# Patient Record
Sex: FEMALE | Race: WHITE | NOT HISPANIC OR LATINO | Employment: PART TIME | URBAN - METROPOLITAN AREA
[De-identification: names, ages, dates, MRNs, and addresses within clinical notes are randomized per-mention and may not be internally consistent; named-entity substitution may affect disease eponyms.]

---

## 2018-07-13 ENCOUNTER — APPOINTMENT (OUTPATIENT)
Dept: WOMENS IMAGING | Facility: HOSPITAL | Age: 48
End: 2018-07-13

## 2018-07-13 PROCEDURE — 77067 SCR MAMMO BI INCL CAD: CPT | Performed by: RADIOLOGY

## 2018-07-24 ENCOUNTER — APPOINTMENT (OUTPATIENT)
Dept: WOMENS IMAGING | Facility: HOSPITAL | Age: 48
End: 2018-07-24

## 2018-07-24 PROCEDURE — MDREVSPNEW: Performed by: RADIOLOGY

## 2018-07-24 PROCEDURE — 76641 ULTRASOUND BREAST COMPLETE: CPT | Performed by: RADIOLOGY

## 2019-09-06 ENCOUNTER — APPOINTMENT (OUTPATIENT)
Dept: WOMENS IMAGING | Facility: HOSPITAL | Age: 49
End: 2019-09-06

## 2019-09-16 ENCOUNTER — APPOINTMENT (OUTPATIENT)
Dept: WOMENS IMAGING | Facility: HOSPITAL | Age: 49
End: 2019-09-16

## 2019-09-16 PROCEDURE — 77067 SCR MAMMO BI INCL CAD: CPT | Performed by: RADIOLOGY

## 2019-09-16 PROCEDURE — 77063 BREAST TOMOSYNTHESIS BI: CPT | Performed by: RADIOLOGY

## 2021-02-24 ENCOUNTER — TELEPHONE (OUTPATIENT)
Dept: INTERNAL MEDICINE | Facility: CLINIC | Age: 51
End: 2021-02-24

## 2021-02-24 NOTE — TELEPHONE ENCOUNTER
PLEASE CONTACT HUB MANAGER AND INFORM ABOUT DANILO PRESCOTT LEAVING TO UPDATE [employer] DIRECTORY.

## 2021-03-05 ENCOUNTER — APPOINTMENT (OUTPATIENT)
Dept: WOMENS IMAGING | Facility: HOSPITAL | Age: 51
End: 2021-03-05

## 2021-03-05 PROCEDURE — 77063 BREAST TOMOSYNTHESIS BI: CPT | Performed by: RADIOLOGY

## 2021-03-05 PROCEDURE — 77067 SCR MAMMO BI INCL CAD: CPT | Performed by: RADIOLOGY

## 2021-06-29 ENCOUNTER — TRANSCRIBE ORDERS (OUTPATIENT)
Dept: ADMINISTRATIVE | Facility: HOSPITAL | Age: 51
End: 2021-06-29

## 2021-06-29 DIAGNOSIS — R56.9 SEIZURES (HCC): Primary | ICD-10-CM

## 2021-07-30 ENCOUNTER — HOSPITAL ENCOUNTER (OUTPATIENT)
Dept: MRI IMAGING | Facility: HOSPITAL | Age: 51
Discharge: HOME | End: 2021-07-30
Admitting: FAMILY MEDICINE

## 2021-07-30 DIAGNOSIS — R56.9 SEIZURES (HCC): ICD-10-CM

## 2021-07-30 PROCEDURE — 70551 MRI BRAIN STEM W/O DYE: CPT

## 2021-08-13 ENCOUNTER — OFFICE VISIT (OUTPATIENT)
Dept: NEUROLOGY | Facility: CLINIC | Age: 51
End: 2021-08-13

## 2021-08-13 VITALS
WEIGHT: 168 LBS | BODY MASS INDEX: 28.68 KG/M2 | HEIGHT: 64 IN | DIASTOLIC BLOOD PRESSURE: 68 MMHG | HEART RATE: 106 BPM | OXYGEN SATURATION: 98 % | SYSTOLIC BLOOD PRESSURE: 138 MMHG

## 2021-08-13 DIAGNOSIS — R40.4 SPELL OF ALTERED CONSCIOUSNESS: Primary | ICD-10-CM

## 2021-08-13 PROCEDURE — 99205 OFFICE O/P NEW HI 60 MIN: CPT | Performed by: PSYCHIATRY & NEUROLOGY

## 2021-08-13 NOTE — PROGRESS NOTES
"Chief Complaint  Seizures (began June 2015 and worsening,)    Subjective          Joseline Smith presents to [facility] NEUROLOGY for   HISTORY OF PRESENT ILLNESS:    Joseline Smith is a 50 year old right handed woman who presents to neurology clinic for initial evaluation and treatment of seizures.  She reports a history of spells starting in 6/2015 which were infrequent initially.  She was getting ready to work out and suddenly she had a strange feeling.  She has a strange epigastric sensation and the left side of her body will start to tingle and feel strange and then there is often a dejavu and dream like sensation.  She has a feeling of impending doom.  The spells last less than a minute about 45 seconds.  She initially thought she was having a panic attack at that time.  In the beginning she was having them maybe once every couple months.  She thinks that they are getting worse as she is perimenopausal.  She has noticed increased episodes with insomnia.  She tells me she has had some auditory involvement more recently where people will be talking to her and saying things that they are not saying.  This has happened 3 times more recently since end of July 2021.  She denies any associated headaches.  She did have migraines around 10 years ago with visual auras.  She seems to get them every couple weeks and she has gone up to 21 to 28 days without having them at times.  They tend to cluster around her menses.  She had a brain MRI scan done on 7/30/2021 which I reviewed the images independently on her visit today and demonstrated a possible old very small lacunar stroke in the left caudate which I informed her of the findings on her visit today.  These spells have only been increasing in frequency.  She denies any head trauma.  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  She denies any family history of seizures.  No history of childhood febrile seizures.  No history of meningitis or encephalitis.   She denies any anxiety.      Past Medical History:   Diagnosis Date   • Migraine    • Numbness and tingling    • Seizures (CMS/HCC)    • Stroke (CMS/HCC)         Family History   Problem Relation Age of Onset   • Arthritis Mother    • Diabetes Mother    • Migraines Sister         Social History     Socioeconomic History   • Marital status:      Spouse name: Not on file   • Number of children: Not on file   • Years of education: Not on file   • Highest education level: Not on file   Tobacco Use   • Smoking status: Former Smoker   Vaping Use   • Vaping Use: Never used   Substance and Sexual Activity   • Alcohol use: Yes     Comment: occasional   • Drug use: Never   • Sexual activity: Defer        I have personally reviewed the ROS as stated below.     Review of Systems   Constitutional: Negative for activity change, appetite change and fever.   HENT: Negative for hearing loss, trouble swallowing and voice change.    Eyes: Negative for blurred vision, double vision and pain.   Respiratory: Negative for cough, shortness of breath and wheezing.    Cardiovascular: Negative for palpitations and leg swelling.   Gastrointestinal: Negative for constipation, nausea and GERD.   Endocrine: Negative for heat intolerance and polydipsia.   Genitourinary: Negative for dysuria, flank pain and frequency.   Musculoskeletal: Negative for back pain and gait problem.   Allergic/Immunologic: Negative for environmental allergies and food allergies.   Neurological: Positive for seizures and numbness. Negative for dizziness, tremors, syncope, facial asymmetry, speech difficulty, weakness, light-headedness, headache, memory problem and confusion.   Hematological: Does not bruise/bleed easily.   Psychiatric/Behavioral: Negative  "for agitation, behavioral problems, decreased concentration, dysphoric mood, hallucinations, self-injury, sleep disturbance, suicidal ideas, negative for hyperactivity, depressed mood and stress. The patient is not nervous/anxious.         Objective   Vital Signs:   /68   Pulse 106   Ht 162.6 cm (64\")   Wt 76.2 kg (168 lb)   SpO2 98%   BMI 28.84 kg/m²       PHYSICAL EXAM:    General   Mental Status - Alert. General Appearance - Well developed, Well groomed, Oriented and Cooperative. Orientation - Oriented X3.       Head and Neck  Head - normocephalic, atraumatic with no lesions or palpable masses.  Neck    Global Assessment - supple.       Eye   Sclera/Conjunctiva - Bilateral - Normal.    ENMT  Mouth and Throat   Oral Cavity/Oropharynx: Oropharynx - the soft palate,uvula and tongue are normal in appearance.    Chest and Lung Exam   Chest - lung clear to auscultation bilaterally.    Cardiovascular   Cardiovascular examination reveals  - normal heart sounds, regular rate and rhythm.    Neurologic   Mental Status: Speech - Normal. Cognitive function - appropriate fund of knowledge. No impairment of attention, Impairment of concentration, impairment of long term memory or impairment of short term memory.  Cranial Nerves:   II Optic: Visual acuity - Left - Normal. Right - Normal. Visual fields - Normal (to confrontation).  III Oculomotor: Pupillary constriction - Left - Normal. Right - Normal.  VII Facial: - Normal Bilaterally.  VIII Acoustic - Bilateral - Hearing normal and (Hearing tested by finger rub).   IX Glossopharyngeal / X Vagus - Normal.  XI Accessory: Trapezius - Bilateral - Normal. Sternocleidomastoid - Bilateral - Normal.  XII Hypoglossal - Bilateral - Normal.  Eye Movements: - Normal Bilaterally.  Sensory:   Light Touch: Intact - Globally.  Motor:   Bulk and Contour: - Normal.  Tone: - Normal.  Tremor: Not present.  Strength: 5/5 normal muscle strength - All Muscles.   General Assessment of " Reflexes: - deep tendon reflexes are normal. Coordination - No Impairment of finger-to-nose or Impairment of rapid alternating movements. Gait - Normal.       Result Review :                 Assessment and Plan    Problem List Items Addressed This Visit        Neuro    Spell of altered consciousness - Primary    Current Assessment & Plan     50 year old woman with spells/episodes that are short lasting with dejavu sensation, epigastric sensation and feeling of impending doom and tingling on the left side of her body without LOC.  She had a brain MRI scan done on 7/30/2021 which I reviewed the images independently on her visit today and demonstrated a possible old very small lacunar stroke in the left caudate which I informed her of the findings on her visit today. I will order a prolonged EEG to further evaluate her underlying risk for possible temporal lobe epilepsy or focal epilepsy given her sensations which appear to be classic temporal lobe epilepsy auras.  We will start patient on AED but she would like to wait to have the EEG done before starting AED.  I advised her not to drive till we get the EEG done and start her on therapeutic dose of medication.  Discussed seizure precautions at length and provided patient education information.           Relevant Orders    EEG          I spent 64 minutes caring for Joseline on this date of service. This time includes time spent by me in the following activities:preparing for the visit, reviewing tests, obtaining and/or reviewing a separately obtained history, performing a medically appropriate examination and/or evaluation , counseling and educating the patient/family/caregiver, ordering medications, tests, or procedures, documenting information in the medical record, independently interpreting results and communicating that information with the patient/family/caregiver and care coordination    Follow Up   No follow-ups on file.  Patient was given instructions and  counseling regarding her condition or for health maintenance advice. Please see specific information pulled into the AVS if appropriate.

## 2021-08-13 NOTE — ASSESSMENT & PLAN NOTE
50 year old woman with spells/episodes that are short lasting with dejavu sensation, epigastric sensation and feeling of impending doom and tingling on the left side of her body without LOC.  She had a brain MRI scan done on 7/30/2021 which I reviewed the images independently on her visit today and demonstrated a possible old very small lacunar stroke in the left caudate which I informed her of the findings on her visit today. I will order a prolonged EEG to further evaluate her underlying risk for possible temporal lobe epilepsy or focal epilepsy given her sensations which appear to be classic temporal lobe epilepsy auras.  We will start patient on AED but she would like to wait to have the EEG done before starting AED.  I advised her not to drive till we get the EEG done and start her on therapeutic dose of medication.  Discussed seizure precautions at length and provided patient education information.

## 2021-08-13 NOTE — PATIENT INSTRUCTIONS
[facility]  Marisa Maddox MD  Neurology clinic  [phone]    With anti-seizure medications, you may initially notice side effects of fatigue, drowsiness, unsteadiness, and dizziness.  Other possible side effects include nausea, abdominal pain, headache, blurry or double vision, slurred speech and mood changes.  Generally, patients will noticed these symptoms when the medication is first started or with higher doses and will go away with time.    It is import to consistently take your medication every day.  Missing just one dose may put you at risk for a breakthrough seizure.  Consider using reminders on your phone or a pill box.    If you develop a rash, please call the neurology clinic immediately or notify another healthcare professional, as this may be potentially life-threatening.  If you are unable to reach a healthcare professional, go to the emergency room immediately for further evaluation.    If you develop thoughts of wanting to hurt yourself or others, please call the neurology clinic immediately to notify another healthcare professional.  If you are unable to reach a healthcare professional, go to the emergency room immediately for further evaluation.    It is the [place] state law that you cannot drive within 90 days of a seizure.    You should avoid certain activities that if you were to have a seizure, you could harm yourself or others. In general, it is recommended that you avoid operating heavy machinery or power tools, swimming or taking baths by yourself (showers are ok), don't stand over open flames, don't get on high ladders or the roof.  I also recommend to avoid sleeping on your stomach.    For further information on epilepsy and resources available to patients and their families, please visit the [facility] at www.efky.org or call [phone].    **Check out the [facility]'s monthly Art Group Gathering.  They are located  at [facility], [address] [place] [address].  Call Laura Puga at [phone] or email her at [email or web address] for the dates of future gatherings.**      **If you have having memory problems, consider HOBSCOTCH (Home-Based Self-management and Cognitive Training Changes lives).  It is an 8 week self-management program for adults with epilepsy and memory problems.  The program is free at the [facility].  Contact Beatriz Gusman at [phone] or [email or web address].**          In general, we recommend using good judgement when you are doing certain activities and to avoid those activities that if you were to have a seizure, you could harm yourself or others. In the [facility], it is the law that you cannot drive within 90 days of a seizure. We also recommend not standing over open flames, not getting on high ladders or the roof, not swimming or taking baths by yourself (showers are ok) and not operating heavy machinery or power tools.

## 2021-08-24 ENCOUNTER — TELEPHONE (OUTPATIENT)
Dept: NEUROLOGY | Facility: CLINIC | Age: 51
End: 2021-08-24

## 2021-08-24 NOTE — TELEPHONE ENCOUNTER
Caller: Joseline Smith    Relationship: Self    Best call back number: [phone]    What was the call regarding: PT CALLED TO VERIFY IF EEG SCHEDULED FOR TOMORROW MORNING AT 10:30AM IS A SLEEP DEPRIVED EEG OR NOT.    PER KINDRA, EEG IS SLEEP DEPRIVED. RECOMMENDED PT TAKE A SMALL NAP THIS AFTERNOON/EVENING AND SLEEP NO LATER THAN 10/11PM TONIGHT TO ENSURE SHE IS ABLE TO SLEEP FOR EEG APPT IN THE MORNING. PT VERBALIZED UNDERSTANDING AND THANKED OFFICE FOR THE CLARIFICATION.    DOCUMENTING PER HUB PROTOCOL.

## 2021-08-25 ENCOUNTER — TELEPHONE (OUTPATIENT)
Dept: NEUROLOGY | Facility: CLINIC | Age: 51
End: 2021-08-25

## 2021-08-25 ENCOUNTER — HOSPITAL ENCOUNTER (OUTPATIENT)
Dept: NEUROLOGY | Facility: HOSPITAL | Age: 51
Discharge: HOME | End: 2021-08-25
Admitting: PSYCHIATRY & NEUROLOGY

## 2021-08-25 DIAGNOSIS — R40.4 SPELL OF ALTERED CONSCIOUSNESS: ICD-10-CM

## 2021-08-25 DIAGNOSIS — R40.4 SPELL OF ALTERED CONSCIOUSNESS: Primary | ICD-10-CM

## 2021-08-25 PROCEDURE — 95813 EEG EXTND MNTR 61-119 MIN: CPT | Performed by: PSYCHIATRY & NEUROLOGY

## 2021-08-25 PROCEDURE — 95813 EEG EXTND MNTR 61-119 MIN: CPT

## 2021-08-25 RX ORDER — LEVETIRACETAM 500 MG/1
500 TABLET ORAL 2 TIMES DAILY
Qty: 67 TABLET | Refills: 0 | Status: SHIPPED | OUTPATIENT
Start: 2021-08-25 | End: 2021-10-26 | Stop reason: SDUPTHER

## 2021-08-25 NOTE — TELEPHONE ENCOUNTER
Per Dr. Maddox he wanted me to notify patient that her EEG results are what he suspected, and that he would like for patient to start KEPPRA 500mg at night for one week, then Keppra 500mg twice a day. Sent into patients pharmacy. [facility] for patient asking to call the office to set up a sooner appt to discuss further

## 2021-10-26 DIAGNOSIS — R40.4 SPELL OF ALTERED CONSCIOUSNESS: ICD-10-CM

## 2021-10-26 RX ORDER — LEVETIRACETAM 500 MG/1
500 TABLET ORAL 2 TIMES DAILY
Qty: 67 TABLET | Refills: 0 | Status: SHIPPED | OUTPATIENT
Start: 2021-10-26 | End: 2021-11-17 | Stop reason: SDUPTHER

## 2021-10-26 NOTE — TELEPHONE ENCOUNTER
Caller: Joseline Smith    Relationship: Self      Medication requested (name and dosage): KEPPRA 500MG TABLETS    Requested Prescriptions:   Requested Prescriptions     Pending Prescriptions Disp Refills   • levETIRAcetam (KEPPRA) 500 MG tablet 67 tablet 0     Sig: Take 1 tablet by mouth 2 (Two) Times a Day. Take 1 tablet at night for one week, then twice a day        Pharmacy where request should be sent: [address] AT [facility] [phone] [facility] [phone] [facility]    Additional details provided by patient: PT STATES SHE HAS ABOUT 1 WEEKS WORTH LEFT    Best call back number: [phone]    Does the patient have less than a 3 day supply:  [x] Yes  [x] No

## 2021-11-17 ENCOUNTER — OFFICE VISIT (OUTPATIENT)
Dept: NEUROLOGY | Facility: CLINIC | Age: 51
End: 2021-11-17

## 2021-11-17 VITALS
BODY MASS INDEX: 27.83 KG/M2 | OXYGEN SATURATION: 100 % | HEIGHT: 64 IN | HEART RATE: 63 BPM | SYSTOLIC BLOOD PRESSURE: 112 MMHG | WEIGHT: 163 LBS | DIASTOLIC BLOOD PRESSURE: 72 MMHG

## 2021-11-17 DIAGNOSIS — R40.4 SPELL OF ALTERED CONSCIOUSNESS: ICD-10-CM

## 2021-11-17 DIAGNOSIS — G40.109 SEIZURE, TEMPORAL LOBE (HCC): Primary | ICD-10-CM

## 2021-11-17 PROCEDURE — 99214 OFFICE O/P EST MOD 30 MIN: CPT | Performed by: PSYCHIATRY & NEUROLOGY

## 2021-11-17 RX ORDER — LEVETIRACETAM 500 MG/1
500 TABLET ORAL 2 TIMES DAILY
Qty: 60 TABLET | Refills: 2 | Status: SHIPPED | OUTPATIENT
Start: 2021-11-17 | End: 2022-02-21 | Stop reason: SDUPTHER

## 2021-11-17 NOTE — PROGRESS NOTES
"Chief Complaint  Seizures (LV: 8/13 EEG f/u)    Subjective          Joseline Smith presents to [facility] NEUROLOGY for   HISTORY OF PRESENT ILLNESS:    Joseline Smith is a 51 year old right handed woman who returns to neurology clinic for follow up evaluation and treatment of seizures and concerns for left temporal lobe epilepsy.  She reports a history of spells starting in 6/2015 which were infrequent initially.  She was getting ready to work out and suddenly she had a strange feeling.  She has a strange epigastric sensation and the left side of her body will start to tingle and feel strange and then there is often a dejavu and dream like sensation.  She has a feeling of impending doom.  The spells last less than a minute about 45 seconds.  She initially thought she was having a panic attack at that time.  In the beginning she was having them maybe once every couple months.  She thinks that they are getting worse as she is perimenopausal.  She has noticed increased episodes with insomnia.  She tells me she has had some auditory involvement where people will be talking to her and saying things that they are not saying.  This has happened 3 times since end of July 2021 but has not had this since starting the Keppra.  She denies any associated headaches.  She did have migraines around 10 years ago with visual auras.  She seems to get them every couple weeks and she has gone up to 21 to 28 days without having them at times.  They tend to cluster around her menses.  She had a brain MRI scan done on 7/30/2021 which demonstrated a possible old very small lacunar stroke in the left caudate which I informed her of the findings on her visit today and she will start a baby aspirin.  These spells have only been increasing in frequency.  She denies any head trauma.  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  She denies any family history of seizures.  No history of childhood febrile seizures.  No history of meningitis or encephalitis.   She denies any anxiety.  She had a prolonged EEG which demonstrated focal right temporal slowing which may indicate an underlying structural or functional abnormality involving the right temporal lobe and occasional sharply contoured activity within this region which may be potentially epileptogenic activity placing patient at risk for focal seizures. I started her on Keppra 500 mg BID following these results.  She has not had any further seizures.  She thinks she feels better since starting this medicine and has not had a spell since 9/16/2021.    Past Medical History:   Diagnosis Date   • Migraine    • Numbness and tingling    • Seizures (HCC)    • Stroke (HCC)         Family History   Problem Relation Age of Onset   • Arthritis Mother    • Diabetes Mother    • Migraines Sister         Social History     Socioeconomic History   • Marital status:    Tobacco Use   • Smoking status: Former Smoker   Vaping Use   • Vaping Use: Never used   Substance and Sexual Activity   • Alcohol use: Yes     Comment: occasional   • Drug use: Never   • Sexual activity: Defer        I have personally reviewed the ROS as stated below.     Review of Systems   Neurological: Positive for dizziness (has subsided , but when just starting keppra) and seizures (last sz 9/16- she had COVID, but had delayed the start of Keppra). Negative for tremors, syncope, facial asymmetry, speech difficulty, weakness, light-headedness, numbness, headache, memory problem and confusion.   Psychiatric/Behavioral: Negative for agitation, behavioral problems, decreased concentration, dysphoric mood, hallucinations, self-injury, sleep disturbance, suicidal ideas, negative for hyperactivity, depressed mood and stress. " "The patient is not nervous/anxious.         Objective   Vital Signs:   /72 (BP Location: Right arm, Patient Position: Sitting)   Pulse 63   Ht 162.6 cm (64.02\")   Wt 73.9 kg (163 lb)   SpO2 100%   BMI 27.96 kg/m²       PHYSICAL EXAM:    General   Mental Status - Alert. General Appearance - Well developed, Well groomed, Oriented and Cooperative. Orientation - Oriented X3.       Head and Neck  Head - normocephalic, atraumatic with no lesions or palpable masses.  Neck    Global Assessment - supple.       Eye   Sclera/Conjunctiva - Bilateral - Normal.    ENMT  Mouth and Throat   Oral Cavity/Oropharynx: Oropharynx - the soft palate,uvula and tongue are normal in appearance.    Chest and Lung Exam   Chest - lung clear to auscultation bilaterally.    Cardiovascular   Cardiovascular examination reveals  - normal heart sounds, regular rate and rhythm.    Neurologic   Mental Status: Speech - Normal. Cognitive function - appropriate fund of knowledge. No impairment of attention, Impairment of concentration, impairment of long term memory or impairment of short term memory.  Cranial Nerves:   II Optic: Visual acuity - Left - Normal. Right - Normal. Visual fields - Normal (to confrontation).  III Oculomotor: Pupillary constriction - Left - Normal. Right - Normal.  VII Facial: - Normal Bilaterally.  VIII Acoustic - Bilateral - Hearing normal and (Hearing tested by finger rub).   IX Glossopharyngeal / X Vagus - Normal.  XI Accessory: Trapezius - Bilateral - Normal. Sternocleidomastoid - Bilateral - Normal.  XII Hypoglossal - Bilateral - Normal.  Eye Movements: - Normal Bilaterally.  Sensory:   Light Touch: Intact - Globally.  Motor:   Bulk and Contour: - Normal.  Tone: - Normal.  Tremor: Not present.  Strength: 5/5 normal muscle strength - All Muscles.   General Assessment of Reflexes: - deep tendon reflexes are normal. Coordination - No Impairment of finger-to-nose or Impairment of rapid alternating movements. Gait - " Normal.       Result Review :                 Assessment and Plan    Problem List Items Addressed This Visit        Neuro    Spell of altered consciousness    Current Assessment & Plan     As per temporal lobe epilepsy.           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  She had a brain MRI scan done on 7/30/2021 which demonstrated a possible old very small lacunar stroke in the left caudate which I informed her of the findings on her visit today and she should take a baby aspirin daily.   She had a prolonged EEG which demonstrated focal right temporal slowing which may indicate an underlying structural or functional abnormality involving the right temporal lobe and occasional sharply contoured activity within this region which may be potentially epileptogenic activity placing patient at risk for focal seizures.  I started her on Keppra 500 mg BID following these results.  She has not had any further seizures.  She thinks she feels better since starting this medicine and has not had a spell since 9/16/2021.  I will continue this medicine.  I discussed SUDEP risk with patient.  Discussed seizure precautions at length and provided patient education information.         Relevant Medications    levETIRAcetam (KEPPRA) 500 MG tablet          I spent 32 minutes caring for Joseline on this date of service. This time includes time spent by me in the following activities:preparing for the visit, reviewing tests, obtaining and/or reviewing a separately obtained history, performing a medically appropriate examination and/or evaluation , counseling and educating the  patient/family/caregiver, ordering medications, tests, or procedures, documenting information in the medical record and care coordination    Follow Up   Return in about 3 months (around 2/17/2022).  Patient was given instructions and counseling regarding her condition or for health maintenance advice. Please see specific information pulled into the AVS if appropriate.

## 2021-11-17 NOTE — PATIENT INSTRUCTIONS
[facility]  Marisa Maddox MD  Neurology clinic  [phone]    With anti-seizure medications, you may initially notice side effects of fatigue, drowsiness, unsteadiness, and dizziness.  Other possible side effects include nausea, abdominal pain, headache, blurry or double vision, slurred speech and mood changes.  Generally, patients will noticed these symptoms when the medication is first started or with higher doses and will go away with time.    It is import to consistently take your medication every day.  Missing just one dose may put you at risk for a breakthrough seizure.  Consider using reminders on your phone or a pill box.    If you develop a rash, please call the neurology clinic immediately or notify another healthcare professional, as this may be potentially life-threatening.  If you are unable to reach a healthcare professional, go to the emergency room immediately for further evaluation.    If you develop thoughts of wanting to hurt yourself or others, please call the neurology clinic immediately to notify another healthcare professional.  If you are unable to reach a healthcare professional, go to the emergency room immediately for further evaluation.    It is the [place] state law that you cannot drive within 90 days of a seizure.    You should avoid certain activities that if you were to have a seizure, you could harm yourself or others. In general, it is recommended that you avoid operating heavy machinery or power tools, swimming or taking baths by yourself (showers are ok), don't stand over open flames, don't get on high ladders or the roof.  I also recommend to avoid sleeping on your stomach.    For further information on epilepsy and resources available to patients and their families, please visit the Epilepsy Foundation of hospitals at www.efky.org or call [phone].    **Check out the Epilepsy Foundation of hospitals's monthly Art Group Gathering.  They are located  at [facility], [address] [place] [address].  Call Laura Puga at [phone] or email her at [email or web address] for the dates of future gatherings.**      **If you have having memory problems, consider HOBSCOTCH (Home-Based Self-management and Cognitive Training Changes lives).  It is an 8 week self-management program for adults with epilepsy and memory problems.  The program is free at the [facility].  Contact Beatriz Gusman at [phone] or [email or web address].**          In general, we recommend using good judgement when you are doing certain activities and to avoid those activities that if you were to have a seizure, you could harm yourself or others. In the [facility], it is the law that you cannot drive within 90 days of a seizure. We also recommend not standing over open flames, not getting on high ladders or the roof, not swimming or taking baths by yourself (showers are ok) and not operating heavy machinery or power tools.

## 2021-11-17 NOTE — ASSESSMENT & PLAN NOTE
50 year old woman with spells/episodes that are short lasting with dejavu sensation, epigastric sensation and feeling of impending doom and tingling on the left side of her body without LOC and EEG findings consistent with right temporal lobe epilepsy.  She had a brain MRI scan done on 7/30/2021 which demonstrated a possible old very small lacunar stroke in the left caudate which I informed her of the findings on her visit today and she should take a baby aspirin daily.   She had a prolonged EEG which demonstrated focal right temporal slowing which may indicate an underlying structural or functional abnormality involving the right temporal lobe and occasional sharply contoured activity within this region which may be potentially epileptogenic activity placing patient at risk for focal seizures.  I started her on Keppra 500 mg BID following these results.  She has not had any further seizures.  She thinks she feels better since starting this medicine and has not had a spell since 9/16/2021.  I will continue this medicine.  I discussed SUDEP risk with patient.  Discussed seizure precautions at length and provided patient education information.

## 2022-02-21 DIAGNOSIS — G40.109 SEIZURE, TEMPORAL LOBE: ICD-10-CM

## 2022-02-21 DIAGNOSIS — R40.4 SPELL OF ALTERED CONSCIOUSNESS: ICD-10-CM

## 2022-02-21 RX ORDER — LEVETIRACETAM 500 MG/1
500 TABLET ORAL 2 TIMES DAILY
Qty: 60 TABLET | Refills: 2 | Status: SHIPPED | OUTPATIENT
Start: 2022-02-21 | End: 2022-03-25 | Stop reason: SDUPTHER

## 2022-03-25 ENCOUNTER — OFFICE VISIT (OUTPATIENT)
Dept: NEUROLOGY | Facility: CLINIC | Age: 52
End: 2022-03-25

## 2022-03-25 VITALS
BODY MASS INDEX: 27.83 KG/M2 | HEART RATE: 67 BPM | OXYGEN SATURATION: 98 % | SYSTOLIC BLOOD PRESSURE: 134 MMHG | HEIGHT: 64 IN | DIASTOLIC BLOOD PRESSURE: 80 MMHG | WEIGHT: 163 LBS

## 2022-03-25 DIAGNOSIS — R40.4 SPELL OF ALTERED CONSCIOUSNESS: ICD-10-CM

## 2022-03-25 DIAGNOSIS — G40.109 SEIZURE, TEMPORAL LOBE: Primary | ICD-10-CM

## 2022-03-25 PROCEDURE — 99213 OFFICE O/P EST LOW 20 MIN: CPT | Performed by: PSYCHIATRY & NEUROLOGY

## 2022-03-25 RX ORDER — LEVETIRACETAM 500 MG/1
500 TABLET ORAL 2 TIMES DAILY
Qty: 60 TABLET | Refills: 2 | Status: SHIPPED | OUTPATIENT
Start: 2022-03-25 | End: 2022-07-21 | Stop reason: SDUPTHER

## 2022-03-25 NOTE — PATIENT INSTRUCTIONS
[facility]  Marisa Maddox MD  Neurology clinic  [phone]    With anti-seizure medications, you may initially notice side effects of fatigue, drowsiness, unsteadiness, and dizziness.  Other possible side effects include nausea, abdominal pain, headache, blurry or double vision, slurred speech and mood changes.  Generally, patients will noticed these symptoms when the medication is first started or with higher doses and will go away with time.    It is import to consistently take your medication every day.  Missing just one dose may put you at risk for a breakthrough seizure.  Consider using reminders on your phone or a pill box.    If you develop a rash, please call the neurology clinic immediately or notify another healthcare professional, as this may be potentially life-threatening.  If you are unable to reach a healthcare professional, go to the emergency room immediately for further evaluation.    If you develop thoughts of wanting to hurt yourself or others, please call the neurology clinic immediately to notify another healthcare professional.  If you are unable to reach a healthcare professional, go to the emergency room immediately for further evaluation.    It is the [place] state law that you cannot drive within 90 days of a seizure.    You should avoid certain activities that if you were to have a seizure, you could harm yourself or others. In general, it is recommended that you avoid operating heavy machinery or power tools, swimming or taking baths by yourself (showers are ok), don't stand over open flames, don't get on high ladders or the roof.  I also recommend to avoid sleeping on your stomach.    For further information on epilepsy and resources available to patients and their families, please visit the Epilepsy Foundation of [facility] at www.efky.org or call [phone].    **Check out the Epilepsy [facility] of [facility]'s monthly Art Group Gathering.  They are located  at [facility], [address] [place] [address].  Call Laura Puga at [phone] or email her at [email or web address] for the dates of future gatherings.**      **If you have having memory problems, consider HOBSCOTCH (Home-Based Self-management and Cognitive Training Changes lives).  It is an 8 week self-management program for adults with epilepsy and memory problems.  The program is free at the [facility].  Contact Beatriz Gusman at [phone] or [email or web address].**         In general, we recommend using good judgement when you are doing certain activities and to avoid those activities that if you were to have a seizure, you could harm yourself or others. In the [facility], it is the law that you cannot drive within 90 days of a seizure. We also recommend not standing over open flames, not getting on high ladders or the roof, not swimming or taking baths by yourself (showers are ok) and not operating heavy machinery or power tools.

## 2022-03-25 NOTE — ASSESSMENT & PLAN NOTE
51 year old woman with spells/episodes that are short lasting with dejavu sensation, epigastric sensation and feeling of impending doom and tingling on the left side of her body without LOC and EEG findings consistent with right temporal lobe epilepsy.  She had a brain MRI scan done on 7/30/2021 which demonstrated a possible old very small lacunar stroke in the left caudate which I informed her of the findings on her visit today and she should take a baby aspirin daily.   She had a prolonged EEG which demonstrated focal right temporal slowing which may indicate an underlying structural or functional abnormality involving the right temporal lobe and occasional sharply contoured activity within this region which may be potentially epileptogenic activity placing patient at risk for focal seizures.  I started her on Keppra 500 mg BID following these results.  She has not had any further seizures.  She is tolerating this medicine well and tells me briefly after taking her medicine she is slightly dizzy especially if she does not eat food when taking the medicine.  Discussed seizure precautions.  Will follow up in 3 months and sooner if needed.

## 2022-03-25 NOTE — PROGRESS NOTES
Chief Complaint  Seizures    Subjective          Joseline Smith presents to [facility] NEUROLOGY for   HISTORY OF PRESENT ILLNESS:    Joseline Smith is a 51 year old woman with spells/episodes that are short lasting with dejavu sensation, epigastric sensation and feeling of impending doom and tingling on the left side of her body without LOC and EEG findings consistent with right temporal lobe epilepsy.  She presents today for follow up.  She had a brain MRI scan done on 7/30/2021 which demonstrated a possible old very small lacunar stroke in the left caudate which I informed her of the findings on her visit today and she should take a baby aspirin daily.   She had a prolonged EEG which demonstrated focal right temporal slowing which may indicate an underlying structural or functional abnormality involving the right temporal lobe and occasional sharply contoured activity within this region which may be potentially epileptogenic activity placing patient at risk for focal seizures.  I started her on Keppra 500 mg BID following these results.  She has not had any further seizures.  She is tolerating this medicine well and tells me briefly after taking her medicine she is slightly dizzy especially if she does not eat food when taking the medicine.      Past Medical History:   Diagnosis Date   • Migraine    • Numbness and tingling    • Seizures (HCC)    • Stroke (HCC)         Family History   Problem Relation Age of Onset   • Arthritis Mother    • Diabetes Mother    • Migraines Sister         Social History     Socioeconomic History   • Marital status:    Tobacco Use   • Smoking status: Former Smoker   Vaping Use   • Vaping Use: Never used   Substance and Sexual Activity   • Alcohol use: Yes     Comment: occasional   • Drug use: Never   • Sexual activity: Defer        I have reviewed and confirmed the accuracy of the ROS as documented by the MA/DASHA/RN Marisa Maddox MD    Review of Systems  "  Neurological: Positive for dizziness. Negative for tremors, seizures, syncope, facial asymmetry, speech difficulty, weakness, light-headedness, numbness, headache, memory problem and confusion.   Psychiatric/Behavioral: Negative for agitation, behavioral problems, decreased concentration, dysphoric mood, hallucinations, self-injury, sleep disturbance, suicidal ideas, negative for hyperactivity, depressed mood and stress. The patient is not nervous/anxious.         Objective   Vital Signs:   /80 (BP Location: Left arm, Patient Position: Sitting)   Pulse 67   Ht 162.6 cm (64.02\")   Wt 73.9 kg (163 lb)   SpO2 98%   BMI 27.96 kg/m²       PHYSICAL EXAM:    General   Mental Status - Alert. General Appearance - Well developed, Well groomed, Oriented and Cooperative. Orientation - Oriented X3.       Head and Neck  Head - normocephalic, atraumatic with no lesions or palpable masses.  Neck    Global Assessment - supple.       Eye   Sclera/Conjunctiva - Bilateral - Normal.    ENMT  Mouth and Throat   Oral Cavity/Oropharynx: Oropharynx - the soft palate,uvula and tongue are normal in appearance.    Chest and Lung Exam   Chest - lung clear to auscultation bilaterally.    Cardiovascular   Cardiovascular examination reveals  - normal heart sounds, regular rate and rhythm.    Neurologic   Mental Status: Speech - Normal. Cognitive function - appropriate fund of knowledge. No impairment of attention, Impairment of concentration, impairment of long term memory or impairment of short term memory.  Cranial Nerves:   II Optic: Visual acuity - Left - Normal. Right - Normal. Visual fields - Normal (to confrontation).  III Oculomotor: Pupillary constriction - Left - Normal. Right - Normal.  VII Facial: - Normal Bilaterally.   IX Glossopharyngeal / X Vagus - Normal.  XI Accessory: Trapezius - Bilateral - Normal. Sternocleidomastoid - Bilateral - Normal.  XII Hypoglossal - Bilateral - Normal.  Eye Movements: - Normal " Bilaterally.  Sensory:   Light Touch: Intact - Globally.  Motor:   Bulk and Contour: - Normal.  Tone: - Normal.  Tremor: Not present.  Strength: 5/5 normal muscle strength - All Muscles.   General Assessment of Reflexes: - deep tendon reflexes are normal. Coordination - No Impairment of finger-to-nose or Impairment of rapid alternating movements. Gait - Normal.     Result Review :{ Labs  Result Review  Imaging  Med Tab  Media :23}                 Assessment and Plan    Problem List Items Addressed This Visit        Neuro    Spell of altered consciousness    Relevant Medications    levETIRAcetam (KEPPRA) 500 MG tablet    Seizure, temporal lobe (HCC) - Primary    Current Assessment & Plan     51 year old woman with spells/episodes that are short lasting with dejavu sensation, epigastric sensation and feeling of impending doom and tingling on the left side of her body without LOC and EEG findings consistent with right temporal lobe epilepsy.  She had a brain MRI scan done on 7/30/2021 which demonstrated a possible old very small lacunar stroke in the left caudate which I informed her of the findings on her visit today and she should take a baby aspirin daily.   She had a prolonged EEG which demonstrated focal right temporal slowing which may indicate an underlying structural or functional abnormality involving the right temporal lobe and occasional sharply contoured activity within this region which may be potentially epileptogenic activity placing patient at risk for focal seizures.  I started her on Keppra 500 mg BID following these results.  She has not had any further seizures.  She is tolerating this medicine well and tells me briefly after taking her medicine she is slightly dizzy especially if she does not eat food when taking the medicine.  Discussed seizure precautions.  Will follow up in 3 months and sooner if needed.             Relevant Medications    levETIRAcetam (KEPPRA) 500 MG tablet             Follow Up   No follow-ups on file.  Patient was given instructions and counseling regarding her condition or for health maintenance advice. Please see specific information pulled into the AVS if appropriate.

## 2022-04-29 ENCOUNTER — APPOINTMENT (OUTPATIENT)
Dept: WOMENS IMAGING | Facility: HOSPITAL | Age: 52
End: 2022-04-29

## 2022-04-29 PROCEDURE — 77063 BREAST TOMOSYNTHESIS BI: CPT | Performed by: RADIOLOGY

## 2022-04-29 PROCEDURE — 77067 SCR MAMMO BI INCL CAD: CPT | Performed by: RADIOLOGY

## 2022-06-21 ENCOUNTER — TELEPHONE (OUTPATIENT)
Dept: GASTROENTEROLOGY | Facility: CLINIC | Age: 52
End: 2022-06-21

## 2022-06-21 NOTE — TELEPHONE ENCOUNTER
Provider: KARLA ACOSTA     Caller: BECCA CAPELLAN    Relationship to Patient: SELF    Phone Number: [phone]    Reason for Call: PT WANTED TO SCHEDULE COLONOSCOPY, JUST FOR A REGULAR SCREENING. SHE REQUESTED TO SEE DR. ACOSTA BECAUSE THAT IS WHO HER  SEES. SHE WOULD LIKE TO BE SEEN FIRST AVAILABLE AND [facility] WORK BEST FOR HER  PT WOULD LIKE A CALL BACK TO SCHEDULE PROCEDURE.

## 2022-07-01 ENCOUNTER — OFFICE VISIT (OUTPATIENT)
Dept: INTERNAL MEDICINE | Facility: CLINIC | Age: 52
End: 2022-07-01

## 2022-07-01 ENCOUNTER — PATIENT ROUNDING (BHMG ONLY) (OUTPATIENT)
Dept: INTERNAL MEDICINE | Facility: CLINIC | Age: 52
End: 2022-07-01

## 2022-07-01 VITALS
DIASTOLIC BLOOD PRESSURE: 82 MMHG | SYSTOLIC BLOOD PRESSURE: 150 MMHG | HEART RATE: 92 BPM | HEIGHT: 64 IN | OXYGEN SATURATION: 99 % | BODY MASS INDEX: 28 KG/M2 | WEIGHT: 164 LBS

## 2022-07-01 DIAGNOSIS — Z00.00 HEALTHCARE MAINTENANCE: Primary | ICD-10-CM

## 2022-07-01 PROBLEM — H43.399 VITREOUS FLOATERS: Status: ACTIVE | Noted: 2021-08-06

## 2022-07-01 PROBLEM — D31.32 NEVUS OF CHOROID OF LEFT EYE: Status: ACTIVE | Noted: 2021-06-04

## 2022-07-01 LAB
BILIRUB BLD-MCNC: NEGATIVE MG/DL
CLARITY, POC: CLEAR
COLOR UR: YELLOW
EXPIRATION DATE: ABNORMAL
GLUCOSE UR STRIP-MCNC: NEGATIVE MG/DL
KETONES UR QL: ABNORMAL
LEUKOCYTE EST, POC: NEGATIVE
Lab: ABNORMAL
NITRITE UR-MCNC: NEGATIVE MG/ML
PH UR: 5 [PH] (ref 5–8)
PROT UR STRIP-MCNC: NEGATIVE MG/DL
RBC # UR STRIP: NEGATIVE /UL
SP GR UR: 1 (ref 1–1.03)
UROBILINOGEN UR QL: NORMAL

## 2022-07-01 PROCEDURE — 81003 URINALYSIS AUTO W/O SCOPE: CPT | Performed by: NURSE PRACTITIONER

## 2022-07-01 PROCEDURE — 99386 PREV VISIT NEW AGE 40-64: CPT | Performed by: NURSE PRACTITIONER

## 2022-07-01 RX ORDER — PROGESTERONE 100 MG/1
1 CAPSULE ORAL DAILY
COMMUNITY
Start: 2022-06-28 | End: 2022-11-18

## 2022-07-01 NOTE — PROGRESS NOTES
July 1, 2022    PATIENT ROUNDING OBTAINED AT CHECKOUT.    SHE FOUND OUR OFFICE BY CHECKING THE [facility] WEBSITE FOR A NURSE PRACTITIONER CLOSE TO HER HOME.  SHE HAD NO PROBLEMS AT ALL GETTING IN OR MAKING HER NEW PATIENT APPOINTMENT.    HER EXPERIENCE TODAY WENT VERY WELL AND SHE WAS VERY PLEASED WITH MS. PATTY DUMONT'S M.A.  SHE SAID EVERYTHING WENT SMOOTHLY FROM THE TIME SHE GOT IN UNTIL CHECKOUT AND COULD THINK OF NO RECOMMENDATIONS THAT WOULD HAVE MADE HER EXPERIENCE ANY BETTER TODAY.  SHE SAID SHE WOULD DEFINITELY RECOMMEND US TO FAMILY AND FRIENDS.

## 2022-07-01 NOTE — PROGRESS NOTES
"Subjective   Joseline Smith is a 51 y.o. female and is here for a comprehensive physical exam. New patient here to establish care.  Health history and questionnaire have been reviewed in its entirety. The patient's previous primary care provider was Dr. Alcaraz (saw once).   The patient reports no problems.    Do you take any herbs or supplements that were not prescribed by a doctor? no    Patient has a history of seizures and she is currently seeing neurology for this.  She has been on Keppra since September 2021 and has not had any seizures since starting the medication    Patient has recently started a keto diet.  She is wanting to see if that will help with her seizures and possibly be able to get off of Keppra.     History:  Sees gynecology    The following portions of the patient's history were reviewed and updated as appropriate: allergies, current medications, past family history, past medical history, past social history, past surgical history and problem list.    Review of Systems  Do you have pain that bothers you in your daily life? no  Pertinent items are noted in HPI.    Objective   /82 (BP Location: Left arm, Patient Position: Sitting, Cuff Size: Adult)   Pulse 92   Ht 162.6 cm (64.02\")   Wt 74.4 kg (164 lb)   SpO2 99%   BMI 28.13 kg/m²     General Appearance:    Alert, cooperative, no distress, appears stated age   Head:    Normocephalic, without obvious abnormality, atraumatic   Eyes:    PERRL, conjunctiva/corneas clear, EOM's intact, both eyes   Ears:    Normal TM's and external ear canals, both ears   Nose:   Nares normal, septum midline, mucosa normal, no drainage    or sinus tenderness   Throat:   Lips, mucosa, and tongue normal; teeth and gums normal   Neck:   Supple, symmetrical, trachea midline, no adenopathy;     thyroid:  no enlargement/tenderness/nodules; no carotid    bruit   Back:     Symmetric, no curvature, ROM normal, no CVA tenderness   Lungs:     Clear to auscultation " bilaterally, respirations unlabored   Chest Wall:    No tenderness or deformity    Heart:    Regular rate and rhythm, S1 and S2 normal, no murmur       Abdomen:     Soft, non-tender, bowel sounds active all four quadrants,     no masses, no organomegaly           Extremities:   Extremities normal, atraumatic, no cyanosis or edema   Pulses:   2+ and symmetric all extremities   Skin:   Skin color, texture, turgor normal, no rashes or lesions   Lymph nodes:   Cervical, supraclavicular, and axillary nodes normal   Neurologic:   Grossly intact, normal strength, sensation and reflexes     throughout        Results for orders placed or performed in visit on 07/01/22   POCT urinalysis dipstick, automated    Specimen: Urine   Result Value Ref Range    Color Yellow Yellow, Straw, Dark Yellow, Lucy    Clarity, UA Clear Clear    Specific Gravity  1.005 1.005 - 1.030    pH, Urine 5.0 5.0 - 8.0    Leukocytes Negative Negative    Nitrite, UA Negative Negative    Protein, POC Negative Negative mg/dL    Glucose, UA Negative Negative mg/dL    Ketones, UA 1+ (A) Negative    Urobilinogen, UA Normal Normal    Bilirubin Negative Negative    Blood, UA Negative Negative    Lot Number [id number]     Expiration Date 01/03/2023        Assessment & Plan   Healthy female exam.      1. Diagnoses and all orders for this visit:    1. Healthcare maintenance (Primary)  -     CBC & Differential  -     Comprehensive Metabolic Panel  -     Lipid Panel With / Chol / HDL Ratio  -     TSH  -     Vitamin D 25 Hydroxy  -     Hemoglobin A1c  -     POCT urinalysis dipstick, automated      Colonoscopy was previously ordered by another provider and patient is waiting to get scheduled.     2. Patient Counseling:  --Nutrition: Stressed importance of moderation in sodium/caffeine intake, saturated fat and cholesterol, caloric balance, sufficient intake of fresh fruits, vegetables, fiber, calcium, iron. Just started keto; no pork, beef   --Exercise: Stressed the  importance of regular exercise. 5-6 days a week; runs and walks, lifts weights  --Injury prevention: Discussed safety belts, safety helmets, smoke detector.   --Dental health: Discussed importance of regular tooth brushing, flossing, and dental visits. Dr. Jasso  --Immunizations reviewed.     3. Follow up based on labs. Patient would like to have her labs checked every 3 months while on the keto diet.

## 2022-07-02 LAB
25(OH)D3+25(OH)D2 SERPL-MCNC: 34.7 NG/ML (ref 30–100)
ALBUMIN SERPL-MCNC: 4.9 G/DL (ref 3.8–4.9)
ALBUMIN/GLOB SERPL: 2 {RATIO} (ref 1.2–2.2)
ALP SERPL-CCNC: 95 IU/L (ref 44–121)
ALT SERPL-CCNC: 18 IU/L (ref 0–32)
AST SERPL-CCNC: 31 IU/L (ref 0–40)
BASOPHILS # BLD AUTO: 0 X10E3/UL (ref 0–0.2)
BASOPHILS NFR BLD AUTO: 1 %
BILIRUB SERPL-MCNC: 0.6 MG/DL (ref 0–1.2)
BUN SERPL-MCNC: 15 MG/DL (ref 6–24)
BUN/CREAT SERPL: 20 (ref 9–23)
CALCIUM SERPL-MCNC: 9.7 MG/DL (ref 8.7–10.2)
CHLORIDE SERPL-SCNC: 102 MMOL/L (ref 96–106)
CHOLEST SERPL-MCNC: 236 MG/DL (ref 100–199)
CHOLEST/HDLC SERPL: 2.6 RATIO (ref 0–4.4)
CO2 SERPL-SCNC: 23 MMOL/L (ref 20–29)
CREAT SERPL-MCNC: 0.76 MG/DL (ref 0.57–1)
EGFRCR SERPLBLD CKD-EPI 2021: 94 ML/MIN/1.73
EOSINOPHIL # BLD AUTO: 0.1 X10E3/UL (ref 0–0.4)
EOSINOPHIL NFR BLD AUTO: 1 %
ERYTHROCYTE [DISTWIDTH] IN BLOOD BY AUTOMATED COUNT: 13.4 % (ref 11.7–15.4)
GLOBULIN SER CALC-MCNC: 2.4 G/DL (ref 1.5–4.5)
GLUCOSE SERPL-MCNC: 82 MG/DL (ref 65–99)
HBA1C MFR BLD: 6.2 % (ref 4.8–5.6)
HCT VFR BLD AUTO: 40.3 % (ref 34–46.6)
HDLC SERPL-MCNC: 90 MG/DL
HGB BLD-MCNC: 13.5 G/DL (ref 11.1–15.9)
IMM GRANULOCYTES # BLD AUTO: 0 X10E3/UL (ref 0–0.1)
IMM GRANULOCYTES NFR BLD AUTO: 0 %
LDLC SERPL CALC-MCNC: 136 MG/DL (ref 0–99)
LYMPHOCYTES # BLD AUTO: 1.1 X10E3/UL (ref 0.7–3.1)
LYMPHOCYTES NFR BLD AUTO: 20 %
MCH RBC QN AUTO: 27.3 PG (ref 26.6–33)
MCHC RBC AUTO-ENTMCNC: 33.5 G/DL (ref 31.5–35.7)
MCV RBC AUTO: 82 FL (ref 79–97)
MONOCYTES # BLD AUTO: 0.3 X10E3/UL (ref 0.1–0.9)
MONOCYTES NFR BLD AUTO: 6 %
NEUTROPHILS # BLD AUTO: 3.9 X10E3/UL (ref 1.4–7)
NEUTROPHILS NFR BLD AUTO: 72 %
PLATELET # BLD AUTO: 281 X10E3/UL (ref 150–450)
POTASSIUM SERPL-SCNC: 4.1 MMOL/L (ref 3.5–5.2)
PROT SERPL-MCNC: 7.3 G/DL (ref 6–8.5)
RBC # BLD AUTO: 4.94 X10E6/UL (ref 3.77–5.28)
SODIUM SERPL-SCNC: 140 MMOL/L (ref 134–144)
TRIGL SERPL-MCNC: 58 MG/DL (ref 0–149)
TSH SERPL DL<=0.005 MIU/L-ACNC: 1.9 UIU/ML (ref 0.45–4.5)
VLDLC SERPL CALC-MCNC: 10 MG/DL (ref 5–40)
WBC # BLD AUTO: 5.4 X10E3/UL (ref 3.4–10.8)

## 2022-07-21 ENCOUNTER — OFFICE VISIT (OUTPATIENT)
Dept: NEUROLOGY | Facility: CLINIC | Age: 52
End: 2022-07-21

## 2022-07-21 VITALS
OXYGEN SATURATION: 99 % | WEIGHT: 160 LBS | HEIGHT: 64 IN | SYSTOLIC BLOOD PRESSURE: 140 MMHG | DIASTOLIC BLOOD PRESSURE: 90 MMHG | BODY MASS INDEX: 27.31 KG/M2 | HEART RATE: 88 BPM

## 2022-07-21 DIAGNOSIS — G40.109 SEIZURE, TEMPORAL LOBE: Primary | ICD-10-CM

## 2022-07-21 PROBLEM — R40.4 SPELL OF ALTERED CONSCIOUSNESS: Status: RESOLVED | Noted: 2021-08-13 | Resolved: 2022-07-21

## 2022-07-21 PROCEDURE — 99213 OFFICE O/P EST LOW 20 MIN: CPT | Performed by: PSYCHIATRY & NEUROLOGY

## 2022-07-21 RX ORDER — LEVETIRACETAM 500 MG/1
500 TABLET ORAL 2 TIMES DAILY
Qty: 60 TABLET | Refills: 4 | Status: SHIPPED | OUTPATIENT
Start: 2022-07-21 | End: 2022-11-18 | Stop reason: SDUPTHER

## 2022-07-21 NOTE — PATIENT INSTRUCTIONS
[facility]  Marisa Maddox MD  Neurology clinic  [phone]    With anti-seizure medications, you may initially notice side effects of fatigue, drowsiness, unsteadiness, and dizziness.  Other possible side effects include nausea, abdominal pain, headache, blurry or double vision, slurred speech and mood changes.  Generally, patients will noticed these symptoms when the medication is first started or with higher doses and will go away with time.    It is import to consistently take your medication every day.  Missing just one dose may put you at risk for a breakthrough seizure.  Consider using reminders on your phone or a pill box.    If you develop a rash, please call the neurology clinic immediately or notify another healthcare professional, as this may be potentially life-threatening.  If you are unable to reach a healthcare professional, go to the emergency room immediately for further evaluation.    If you develop thoughts of wanting to hurt yourself or others, please call the neurology clinic immediately to notify another healthcare professional.  If you are unable to reach a healthcare professional, go to the emergency room immediately for further evaluation.    It is the [place] state law that you cannot drive within 90 days of a seizure.    You should avoid certain activities that if you were to have a seizure, you could harm yourself or others. In general, it is recommended that you avoid operating heavy machinery or power tools, swimming or taking baths by yourself (showers are ok), don't stand over open flames, don't get on high ladders or the roof.  I also recommend to avoid sleeping on your stomach.    For further information on epilepsy and resources available to patients and their families, please visit the Epilepsy Foundation of [facility] at www.efky.org or call [phone].    **Check out the Epilepsy [facility] of [facility]'s monthly Art Group Gathering.  They are located  at [facility], [address] [place] [address].  Call Laura Puga at [phone] or email her at [email or web address] for the dates of future gatherings.**      **If you have having memory problems, consider HOBSCOTCH (Home-Based Self-management and Cognitive Training Changes lives).  It is an 8 week self-management program for adults with epilepsy and memory problems.  The program is free at the [facility].  Contact Beatriz Gusman at [phone] or [email or web address].**         In general, we recommend using good judgement when you are doing certain activities and to avoid those activities that if you were to have a seizure, you could harm yourself or others. In the [facility], it is the law that you cannot drive within 90 days of a seizure. We also recommend not standing over open flames, not getting on high ladders or the roof, not swimming or taking baths by yourself (showers are ok) and not operating heavy machinery or power tools.

## 2022-07-21 NOTE — ASSESSMENT & PLAN NOTE
51 year old woman with spells/episodes that are short lasting with dejavu sensation, epigastric sensation and feeling of impending doom and tingling on the left side of her body without LOC and EEG findings consistent with right temporal lobe epilepsy.  She had a brain MRI scan done on 7/30/2021 which demonstrated a possible old very small lacunar stroke in the left caudate and she should take a baby aspirin daily.   She had a prolonged EEG which demonstrated focal right temporal slowing which may indicate an underlying structural or functional abnormality involving the right temporal lobe and occasional sharply contoured activity within this region which may be potentially epileptogenic activity placing patient at risk for focal seizures.  I started her on Keppra 500 mg BID following these results.  She has not had any further seizures.  She is tolerating this medicine well and she has now also started a ketogenic diet which she is tolerating well.  Discussed seizure precautions.  Will follow up in 4 months and sooner if needed.

## 2022-07-21 NOTE — PROGRESS NOTES
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  She returns today for follow up.  She had a brain MRI scan done on 2021 which demonstrated a possible old very small lacunar stroke in the left caudate which I informed her of the findings on her visit today and she should take a baby aspirin daily.   She had a prolonged EEG which demonstrated focal right temporal slowing which may indicate an underlying structural or functional abnormality involving the right temporal lobe and occasional sharply contoured activity within this region which may be potentially epileptogenic activity placing patient at risk for focal seizures.  I started her on Keppra 500 mg BID following these results.  She has not had any further seizures or seizure like spells.  She is tolerating this medicine well and she is also following a ketogenic diet most recently and feeling good.      Past Medical History:   Diagnosis Date   • Migraine    • Numbness and tingling    • Seizures (HCC)    • Stroke (HCC)         Family History   Problem Relation Age of Onset   • Arthritis Mother    • Diabetes Mother    • Migraines Sister         Social History     Socioeconomic History   • Marital status:    Tobacco Use   • Smoking status: Former Smoker     Packs/day: 1.00     Years: 17.00     Pack years: 17.00     Types: Cigarettes     Start date: 1984     Quit date: 1997     Years since quittin.6   • Smokeless tobacco: Never Used   Vaping Use   • Vaping Use: Never used   Substance and Sexual Activity   • Alcohol use: Yes     Comment: Occasional, light drinking   •  "Drug use: Never   • Sexual activity: Yes     Partners: Male     Birth control/protection: Other        I have reviewed and confirmed the accuracy of the ROS as documented by the MA/LPN/RN Marisa Maddox MD    Review of Systems   Neurological: Negative for dizziness, tremors, seizures, syncope, facial asymmetry, speech difficulty, weakness, light-headedness, numbness, headache, memory problem and confusion.   Psychiatric/Behavioral: Negative for agitation, behavioral problems, decreased concentration, dysphoric mood, hallucinations, self-injury, sleep disturbance, suicidal ideas, negative for hyperactivity, depressed mood and stress. The patient is not nervous/anxious.         Objective   Vital Signs:   /90 (BP Location: Left arm, Patient Position: Sitting, Cuff Size: Adult)   Pulse 88   Ht 162.6 cm (64.02\")   Wt 72.6 kg (160 lb)   SpO2 99%   BMI 27.45 kg/m²       PHYSICAL EXAM:    General   Mental Status - Alert. General Appearance - Well developed, Well groomed, Oriented and Cooperative. Orientation - Oriented X3.       Head and Neck  Head - normocephalic, atraumatic with no lesions or palpable masses.  Neck    Global Assessment - supple.       Eye   Sclera/Conjunctiva - Bilateral - Normal.    ENMT  Mouth and Throat   Oral Cavity/Oropharynx: Oropharynx - the soft palate,uvula and tongue are normal in appearance.    Chest and Lung Exam   Chest - lung clear to auscultation bilaterally.    Cardiovascular   Cardiovascular examination reveals  - normal heart sounds, regular rate and rhythm.    Neurologic   Mental Status: Speech - Normal. Cognitive function - appropriate fund of knowledge. No impairment of attention, Impairment of concentration, impairment of long term memory or impairment of short term memory.  Cranial Nerves:   II Optic: Visual acuity - Left - Normal. Right - Normal. Visual fields - Normal (to confrontation).  III Oculomotor: Pupillary constriction - Left - Normal. Right - Normal.  VII " Facial: - Normal Bilaterally.   IX Glossopharyngeal / X Vagus - Normal.  XI Accessory: Trapezius - Bilateral - Normal. Sternocleidomastoid - Bilateral - Normal.  XII Hypoglossal - Bilateral - Normal.  Eye Movements: - Normal Bilaterally.  Sensory:   Light Touch: Intact - Globally.  Motor:   Bulk and Contour: - Normal.  Tone: - Normal.  Tremor: Not present.  Strength: 5/5 normal muscle strength - All Muscles.   General Assessment of Reflexes: - deep tendon reflexes are normal. Coordination - No Impairment of finger-to-nose or Impairment of rapid alternating movements. Gait - Normal.     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  She had a brain MRI scan done on 7/30/2021 which demonstrated a possible old very small lacunar stroke in the left caudate and she should take a baby aspirin daily.   She had a prolonged EEG which demonstrated focal right temporal slowing which may indicate an underlying structural or functional abnormality involving the right temporal lobe and occasional sharply contoured activity within this region which may be potentially epileptogenic activity placing patient at risk for focal seizures.  I started her on Keppra 500 mg BID following these results.  She has not had any further seizures.  She is tolerating this medicine well and she has now also started a ketogenic diet which she is tolerating well.  Discussed seizure precautions.  Will follow up in 4 months and sooner if needed.             Relevant Medications    levETIRAcetam (KEPPRA) 500 MG tablet          Follow Up   Return in about 4 months (around  11/21/2022).  Patient was given instructions and counseling regarding her condition or for health maintenance advice. Please see specific information pulled into the AVS if appropriate.

## 2022-09-16 ENCOUNTER — TELEPHONE (OUTPATIENT)
Dept: NEUROLOGY | Facility: CLINIC | Age: 52
End: 2022-09-16

## 2022-09-16 NOTE — TELEPHONE ENCOUNTER
Caller: Joseline Smith    Relationship: Self    Best call back number: [phone]    Who are you requesting to speak with (clinical staff, provider,  specific staff member):   DR ROCHA    What was the call regarding:   PAPERWORK FOR DRIVERS LICENSE RENEWAL    PT WILL DROP OFF AT THE OFFICE TODAY.    SHE WILL COMPLETE HER PORTION AND IT WILL HAVE TO BE FAXED BACK IN 30 DAYS.

## 2022-09-22 NOTE — TELEPHONE ENCOUNTER
Caller: Joseline Smith     Relationship: Self     Best call back number: [phone]     Who are you requesting to speak with (clinical staff, provider,  specific staff member):   DR ROCHA     What was the call regarding:   PAPERWORK FOR DRIVERS LICENSE RENEWAL    PT CALLING BACK TO SEE IF PAPERWORK HAS BEEN FAXED (TO THE NUMBER LISTED ON THE FORM) AND PT WANTS TO COME P/U THE COMPLETED PAPERWORK TO HAVE FOR HER FILES.

## 2022-09-23 NOTE — TELEPHONE ENCOUNTER
Called pt an informed papers faxed yesterday. Pt will stop by to get a copy. Copy made and placed in front office for .

## 2022-11-18 ENCOUNTER — OFFICE VISIT (OUTPATIENT)
Dept: NEUROLOGY | Facility: CLINIC | Age: 52
End: 2022-11-18

## 2022-11-18 VITALS
OXYGEN SATURATION: 100 % | WEIGHT: 144 LBS | HEART RATE: 84 BPM | HEIGHT: 65 IN | BODY MASS INDEX: 23.99 KG/M2 | DIASTOLIC BLOOD PRESSURE: 90 MMHG | SYSTOLIC BLOOD PRESSURE: 148 MMHG

## 2022-11-18 DIAGNOSIS — G40.109 SEIZURE, TEMPORAL LOBE: Primary | ICD-10-CM

## 2022-11-18 PROCEDURE — 99213 OFFICE O/P EST LOW 20 MIN: CPT | Performed by: PSYCHIATRY & NEUROLOGY

## 2022-11-18 RX ORDER — LEVETIRACETAM 500 MG/1
500 TABLET ORAL 2 TIMES DAILY
Qty: 60 TABLET | Refills: 6 | Status: SHIPPED | OUTPATIENT
Start: 2022-11-18

## 2022-11-18 NOTE — ASSESSMENT & PLAN NOTE
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 She had a brain MRI scan done on 7/30/2021 which demonstrated a possible old very small lacunar stroke in the left caudate which I informed her of the findings on her visit today and she should take a baby aspirin daily.   She had a prolonged EEG which demonstrated focal right temporal slowing which may indicate an underlying structural or functional abnormality involving the right temporal lobe and occasional sharply contoured activity within this region which may be potentially epileptogenic activity placing patient at risk for focal seizures.  I started her on Keppra 500 mg BID following these results.  She has not had any further seizures or seizure like spells.  She is tolerating this medicine well and she is also following a ketogenic diet and feeling good. I will continue the Keppra 500 mg BID.  I will see her back in 6 months.

## 2022-11-18 NOTE — PROGRESS NOTES
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 She had a brain MRI scan done on 2021 which demonstrated a possible old very small lacunar stroke in the left caudate which I informed her of the findings on her visit today and she should take a baby aspirin daily.   She had a prolonged EEG which demonstrated focal right temporal slowing which may indicate an underlying structural or functional abnormality involving the right temporal lobe and occasional sharply contoured activity within this region which may be potentially epileptogenic activity placing patient at risk for focal seizures.  I started her on Keppra 500 mg BID following these results.  She has not had any further seizures or seizure like spells.  She is tolerating this medicine well and she is also following a ketogenic diet and feeling good.      Past Medical History:   Diagnosis Date   • Migraine    • Numbness and tingling    • Seizures (HCC)    • Stroke (HCC)         Family History   Problem Relation Age of Onset   • Arthritis Mother    • Diabetes Mother    • Migraines Sister         Social History     Socioeconomic History   • Marital status:    Tobacco Use   • Smoking status: Former     Packs/day: 1.00     Years: 17.00     Pack years: 17.00     Types: Cigarettes     Start date: 1984     Quit date: 1997     Years since quittin.0   • Smokeless tobacco: Never   Vaping Use   • Vaping Use: Never used   Substance and Sexual Activity   • Alcohol use: Yes     Comment: Occasional, light drinking   • Drug use: Never   • Sexual activity:  "Yes     Partners: Male     Birth control/protection: Other        I have reviewed and confirmed the accuracy of the ROS as documented by the MA/LPN/RN Marisa Maddox MD    Review of Systems   Constitutional: Negative for activity change, appetite change and fatigue.   Musculoskeletal: Negative for back pain, gait problem and neck pain.   Neurological: Positive for seizures. Negative for dizziness, tremors, syncope, facial asymmetry, speech difficulty, weakness, light-headedness, numbness, headache, memory problem and confusion.   Hematological: Negative for adenopathy. Does not bruise/bleed easily.   Psychiatric/Behavioral: Negative for agitation, behavioral problems, decreased concentration, dysphoric mood, hallucinations, self-injury, sleep disturbance, suicidal ideas, negative for hyperactivity, depressed mood and stress. The patient is not nervous/anxious.         Objective   Vital Signs:   /90   Pulse 84   Ht 164.8 cm (64.89\")   Wt 65.3 kg (144 lb)   SpO2 100%   BMI 24.04 kg/m²       PHYSICAL EXAM:    General   Mental Status - Alert. General Appearance - Well developed, Well groomed, Oriented and Cooperative. Orientation - Oriented X3.       Head and Neck  Head - normocephalic, atraumatic with no lesions or palpable masses.  Neck    Global Assessment - supple.       Eye   Sclera/Conjunctiva - Bilateral - Normal.    ENMT  Mouth and Throat   Oral Cavity/Oropharynx: Oropharynx - the soft palate,uvula and tongue are normal in appearance.    Chest and Lung Exam   Chest - lung clear to auscultation bilaterally.    Cardiovascular   Cardiovascular examination reveals  - normal heart sounds, regular rate and rhythm.    Neurologic   Mental Status: Speech - Normal. Cognitive function - appropriate fund of knowledge. No impairment of attention, Impairment of concentration, impairment of long term memory or impairment of short term memory.  Cranial Nerves:   II Optic: Visual acuity - Left - Normal. Right - " Normal. Visual fields - Normal (to confrontation).  III Oculomotor: Pupillary constriction - Left - Normal. Right - Normal.  VII Facial: - Normal Bilaterally.   IX Glossopharyngeal / X Vagus - Normal.  XI Accessory: Trapezius - Bilateral - Normal. Sternocleidomastoid - Bilateral - Normal.  XII Hypoglossal - Bilateral - Normal.  Eye Movements: - Normal Bilaterally.  Sensory:   Light Touch: Intact - Globally.  Motor:   Bulk and Contour: - Normal.  Tone: - Normal.  Tremor: Not present.  Strength: 5/5 normal muscle strength - All Muscles.   General Assessment of Reflexes: - deep tendon reflexes are normal. Coordination - No Impairment of finger-to-nose or Impairment of rapid alternating movements. Gait - Normal.     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 She had a brain MRI scan done on 7/30/2021 which demonstrated a possible old very small lacunar stroke in the left caudate which I informed her of the findings on her visit today and she should take a baby aspirin daily.   She had a prolonged EEG which demonstrated focal right temporal slowing which may indicate an underlying structural or functional abnormality involving the right temporal lobe and occasional sharply contoured activity within this region which may be potentially epileptogenic activity placing patient at risk for focal seizures.  I started her on Keppra 500 mg BID following these results.  She has not had any further seizures or seizure like spells.  She is tolerating this medicine well and she is also following a ketogenic diet and feeling good. I will continue the Keppra 500 mg BID.  I will see her back  in 6 months.           Relevant Medications    levETIRAcetam (KEPPRA) 500 MG tablet       Follow Up   Return in about 6 months (around 5/18/2023).  Patient was given instructions and counseling regarding her condition or for health maintenance advice. Please see specific information pulled into the AVS if appropriate.

## 2022-11-18 NOTE — PATIENT INSTRUCTIONS
[facility]  Marisa Maddox MD  Neurology clinic  [phone]    With anti-seizure medications, you may initially notice side effects of fatigue, drowsiness, unsteadiness, and dizziness.  Other possible side effects include nausea, abdominal pain, headache, blurry or double vision, slurred speech and mood changes.  Generally, patients will noticed these symptoms when the medication is first started or with higher doses and will go away with time.    It is import to consistently take your medication every day.  Missing just one dose may put you at risk for a breakthrough seizure.  Consider using reminders on your phone or a pill box.    If you develop a rash, please call the neurology clinic immediately or notify another healthcare professional, as this may be potentially life-threatening.  If you are unable to reach a healthcare professional, go to the emergency room immediately for further evaluation.    If you develop thoughts of wanting to hurt yourself or others, please call the neurology clinic immediately to notify another healthcare professional.  If you are unable to reach a healthcare professional, go to the emergency room immediately for further evaluation.    It is the [place] state law that you cannot drive within 90 days of a seizure.    You should avoid certain activities that if you were to have a seizure, you could harm yourself or others. In general, it is recommended that you avoid operating heavy machinery or power tools, swimming or taking baths by yourself (showers are ok), don't stand over open flames, don't get on high ladders or the roof.  I also recommend to avoid sleeping on your stomach.    For further information on epilepsy and resources available to patients and their families, please visit the [facility] at www.efky.org or call [phone].    **Check out the [facility]'s monthly Art Group Gathering.  They are located  at [facility], [address] [place] [address].  Call Laura Puga at [phone] or email her at [email or web address] for the dates of future gatherings.**      **If you have having memory problems, consider HOBSCOTCH (Home-Based Self-management and Cognitive Training Changes lives).  It is an 8 week self-management program for adults with epilepsy and memory problems.  The program is free at the [facility].  Contact Beatriz Gusman at [phone] or [email or web address].**         In general, we recommend using good judgement when you are doing certain activities and to avoid those activities that if you were to have a seizure, you could harm yourself or others. In the [facility], it is the law that you cannot drive within 90 days of a seizure. We also recommend not standing over open flames, not getting on high ladders or the roof, not swimming or taking baths by yourself (showers are ok) and not operating heavy machinery or power tools.

## 2023-04-07 ENCOUNTER — OFFICE VISIT (OUTPATIENT)
Dept: INTERNAL MEDICINE | Facility: CLINIC | Age: 53
End: 2023-04-07
Payer: COMMERCIAL

## 2023-04-07 VITALS
WEIGHT: 147.3 LBS | OXYGEN SATURATION: 99 % | DIASTOLIC BLOOD PRESSURE: 78 MMHG | SYSTOLIC BLOOD PRESSURE: 138 MMHG | BODY MASS INDEX: 24.54 KG/M2 | HEIGHT: 65 IN | HEART RATE: 82 BPM | TEMPERATURE: 98 F

## 2023-04-07 DIAGNOSIS — R00.2 PALPITATIONS: Primary | ICD-10-CM

## 2023-04-07 PROCEDURE — 99213 OFFICE O/P EST LOW 20 MIN: CPT | Performed by: NURSE PRACTITIONER

## 2023-04-07 PROCEDURE — 93000 ELECTROCARDIOGRAM COMPLETE: CPT | Performed by: NURSE PRACTITIONER

## 2023-04-07 RX ORDER — CEFDINIR 300 MG/1
CAPSULE ORAL
COMMUNITY
Start: 2023-03-14

## 2023-04-07 NOTE — PROGRESS NOTES
Subjective   Joseline Smith is a 52 y.o. female. Patient is here today for   Chief Complaint   Patient presents with   • Palpitations     Patient complains of palpitations for over a year few and far between    .    History of Present Illness   Patient is here with c/o palpitations. She has had a few episodes over the past year - maybe 10-15.  Denies any chest pain when she has them.  She thought maybe it could be related to the Keppra that she was on, but her neurologist does not think that it is related to that medication, so patient is here to follow-up.  Patient runs and lifts weights without having any chest pain or shortness of breath.  States that the last time she had palpitations, she was just sitting at work when they started    The following portions of the patient's history were reviewed and updated as appropriate: allergies, current medications, past family history, past medical history, past social history, past surgical history and problem list.    Review of Systems    Objective   Vitals:    04/07/23 0827   BP: 138/78   Pulse: 82   Temp: 98 °F (36.7 °C)   SpO2: 99%     Body mass index is 24.6 kg/m².  Physical Exam  Vitals and nursing note reviewed.   Constitutional:       Appearance: Normal appearance. She is well-developed.   Cardiovascular:      Rate and Rhythm: Normal rate and regular rhythm.      Heart sounds: Normal heart sounds.   Pulmonary:      Effort: Pulmonary effort is normal.      Breath sounds: Normal breath sounds.   Skin:     General: Skin is warm and dry.   Neurological:      Mental Status: She is alert and oriented to person, place, and time.   Psychiatric:         Speech: Speech normal.         Behavior: Behavior normal.         Thought Content: Thought content normal.           ECG 12 Lead    Date/Time: 4/7/2023 8:27 AM  Performed by: Irais Laird APRN  Authorized by: Irais Laird APRN   Comparison: not compared with previous ECG   Previous ECG: no previous ECG  available  Rhythm: sinus rhythm  Rate: normal  BPM: 65  Conduction: conduction normal  ST Segments: ST segments normal  QRS axis: normal  Other: no other findings    Clinical impression: normal ECG  Comments: OR interval 156 ms  QRS interval 80 ms  QTc interval 412 ms              Assessment & Plan   Diagnoses and all orders for this visit:    1. Palpitations (Primary)  -     ECG 12 Lead  -     Holter Monitor - 72 Hour Up To 15 Days; Future      Will order a Holter monitor for patient. F/u based on results. She will follow this summer for fasting labs and a physical.

## 2023-04-19 ENCOUNTER — PRE-PROCEDURE SCREENING (OUTPATIENT)
Dept: GASTROENTEROLOGY | Facility: CLINIC | Age: 53
End: 2023-04-19
Payer: COMMERCIAL

## 2023-04-19 NOTE — TELEPHONE ENCOUNTER
Colonoscopy screening--No personal history of polyps--No family history of polyps or colon cancer--No blood thinners--Medications:                   cefdinir (OMNICEF) 300 MG capsule    levETIRAcetam (KEPPRA) 500 MG tablet  COD LIVER OIL     QUESTIONNAIRE SCREENING  SCAN IN  MEDIA & HAS BEEN SENT TO DOCTOR FOR REVIEW

## 2023-05-01 ENCOUNTER — PREP FOR SURGERY (OUTPATIENT)
Dept: OTHER | Facility: HOSPITAL | Age: 53
End: 2023-05-01
Payer: COMMERCIAL

## 2023-05-01 DIAGNOSIS — Z12.11 SCREEN FOR COLON CANCER: Primary | ICD-10-CM

## 2023-05-25 ENCOUNTER — TELEPHONE (OUTPATIENT)
Dept: GASTROENTEROLOGY | Facility: CLINIC | Age: 53
End: 2023-05-25
Payer: COMMERCIAL

## 2023-05-25 NOTE — TELEPHONE ENCOUNTER
[facility] FOR PROCEDURE - CALL BACK [phone] OPT 1    Dr. Barajas orders to give 250 cc bolus over 1hr. Will continue to monitor.

## 2023-06-09 ENCOUNTER — OFFICE VISIT (OUTPATIENT)
Dept: NEUROLOGY | Facility: CLINIC | Age: 53
End: 2023-06-09
Payer: COMMERCIAL

## 2023-06-09 ENCOUNTER — TELEPHONE (OUTPATIENT)
Dept: CARDIOLOGY | Facility: CLINIC | Age: 53
End: 2023-06-09
Payer: COMMERCIAL

## 2023-06-09 VITALS
BODY MASS INDEX: 23.66 KG/M2 | DIASTOLIC BLOOD PRESSURE: 78 MMHG | HEART RATE: 69 BPM | HEIGHT: 65 IN | SYSTOLIC BLOOD PRESSURE: 132 MMHG | OXYGEN SATURATION: 99 % | WEIGHT: 142 LBS

## 2023-06-09 DIAGNOSIS — G40.109 SEIZURE, TEMPORAL LOBE: Primary | ICD-10-CM

## 2023-06-09 PROCEDURE — 99213 OFFICE O/P EST LOW 20 MIN: CPT | Performed by: PSYCHIATRY & NEUROLOGY

## 2023-06-09 RX ORDER — LEVETIRACETAM 500 MG/1
500 TABLET ORAL 2 TIMES DAILY
Qty: 60 TABLET | Refills: 11 | Status: SHIPPED | OUTPATIENT
Start: 2023-06-09

## 2023-06-09 NOTE — PROGRESS NOTES
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 She had a brain MRI scan done on 2021 which demonstrated a possible old very small lacunar stroke in the left caudate which and I told her that she should take a baby aspirin daily.   She had a prolonged EEG which demonstrated focal right temporal slowing which may indicate an underlying structural or functional abnormality involving the right temporal lobe and occasional sharply contoured activity within this region which may be potentially epileptogenic activity placing patient at risk for focal seizures.  I started her on Keppra 500 mg BID following these results.  She has not had any further seizures or seizure like spells.  She is tolerating this medicine well and she is also following a ketogenic diet and feeling good.       Past Medical History:   Diagnosis Date    Migraine     Numbness and tingling     Seizures     Stroke         Family History   Problem Relation Age of Onset    Arthritis Mother     Diabetes Mother     Migraines Sister         Social History     Socioeconomic History    Marital status:    Tobacco Use    Smoking status: Former     Packs/day: 1.00     Years: 17.00     Pack years: 17.00     Types: Cigarettes     Start date: 1984     Quit date: 1997     Years since quittin.5    Smokeless tobacco: Never   Vaping Use    Vaping Use: Never used   Substance and Sexual Activity    Alcohol use: Yes     Comment: Occasional, light drinking    Drug use: Never    Sexual activity: Yes     Partners: Male     Birth control/protection: Other  "       I have reviewed and confirmed the accuracy of the ROS as documented by the MA/LPN/RN Marisa Maddox MD   Review of Systems   Neurological:  Positive for seizures. Negative for dizziness, tremors, syncope, facial asymmetry, speech difficulty, weakness, light-headedness, numbness, headache, memory problem and confusion.   Psychiatric/Behavioral:  Negative for agitation, behavioral problems, decreased concentration, dysphoric mood, hallucinations, self-injury, sleep disturbance, suicidal ideas, negative for hyperactivity, depressed mood and stress. The patient is not nervous/anxious.       Objective   Vital Signs:   /78   Pulse 69   Ht 164.8 cm (64.88\")   Wt 64.4 kg (142 lb)   SpO2 99%   BMI 23.72 kg/m²       PHYSICAL EXAM:    General   Mental Status - Alert. General Appearance - Well developed, Well groomed, Oriented and Cooperative. Orientation - Oriented X3.       Head and Neck  Head - normocephalic, atraumatic with no lesions or palpable masses.  Neck    Global Assessment - supple.       Eye   Sclera/Conjunctiva - Bilateral - Normal.    ENMT  Mouth and Throat   Oral Cavity/Oropharynx: Oropharynx - the soft palate,uvula and tongue are normal in appearance.    Chest and Lung Exam   Chest - lung clear to auscultation bilaterally.    Cardiovascular   Cardiovascular examination reveals  - normal heart sounds, regular rate and rhythm.    Neurologic   Mental Status: Speech - Normal. Cognitive function - appropriate fund of knowledge. No impairment of attention, Impairment of concentration, impairment of long term memory or impairment of short term memory.  Cranial Nerves:   II Optic: Visual acuity - Left - Normal. Right - Normal. Visual fields - Normal (to confrontation).  III Oculomotor: Pupillary constriction - Left - Normal. Right - Normal.  VII Facial: - Normal Bilaterally.   IX Glossopharyngeal / X Vagus - Normal.  XI Accessory: Trapezius - Bilateral - Normal. Sternocleidomastoid - Bilateral - " Normal.  XII Hypoglossal - Bilateral - Normal.  Eye Movements: - Normal Bilaterally.  Sensory:   Light Touch: Intact - Globally.  Motor:   Bulk and Contour: - Normal.  Tone: - Normal.  Tremor: Not present.  Strength: 5/5 normal muscle strength - All Muscles.   General Assessment of Reflexes: - deep tendon reflexes are normal. Coordination - No Impairment of finger-to-nose or Impairment of rapid alternating movements. Gait - Normal.       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 She had a brain MRI scan done on 7/30/2021 which demonstrated a possible old very small lacunar stroke in the left caudate which and I told her that she should take a baby aspirin daily.   She had a prolonged EEG which demonstrated focal right temporal slowing which may indicate an underlying structural or functional abnormality involving the right temporal lobe and occasional sharply contoured activity within this region which may be potentially epileptogenic activity placing patient at risk for focal seizures.  I started her on Keppra 500 mg BID following these results.  She has not had any further seizures or seizure like spells.  She is tolerating this medicine well and she is also following a ketogenic diet and feeling good.  I will continue the Keppra at this time and will see her back in a year or sooner if needed. Discussed seizure precautions again.           Relevant Medications    levETIRAcetam (KEPPRA) 500 MG tablet       Follow Up   No follow-ups on file.  Patient was given instructions and counseling regarding her condition or for health  maintenance advice. Please see specific information pulled into the AVS if appropriate.

## 2023-06-09 NOTE — TELEPHONE ENCOUNTER
Caller: Becca Capellan    Relationship: Self    Best call back number: [phone]    Caller requesting test results: BECCA CAPELLAN    What test was performed: ZIO PATCH    When was the test performed: 04-13-23        Additional notes: PATIENT IS REQUESTING A FULL COPY OF THE ZIO REPORT THAT DR CRAWFORD RECEIVED. PLEASE NOTIFY HER ON HOW TO PROCEED.

## 2023-06-09 NOTE — ASSESSMENT & PLAN NOTE
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 She had a brain MRI scan done on 7/30/2021 which demonstrated a possible old very small lacunar stroke in the left caudate which and I told her that she should take a baby aspirin daily.   She had a prolonged EEG which demonstrated focal right temporal slowing which may indicate an underlying structural or functional abnormality involving the right temporal lobe and occasional sharply contoured activity within this region which may be potentially epileptogenic activity placing patient at risk for focal seizures.  I started her on Keppra 500 mg BID following these results.  She has not had any further seizures or seizure like spells.  She is tolerating this medicine well and she is also following a ketogenic diet and feeling good.  I will continue the Keppra at this time and will see her back in a year or sooner if needed. Discussed seizure precautions again.

## 2023-06-09 NOTE — LETTER
June 9, 2023     KURT Spencer  [address]  [address]  [facility] [address]    Patient: Joseline Smith   YOB: 1970   Date of Visit: 6/9/2023       Dear KURT Lennon:    Thank you for referring Joseline Smith to me for evaluation. Below are the relevant portions of my assessment and plan of care.    If you have questions, please do not hesitate to call me. I look forward to following Joseline along with you.         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 She had a brain MRI scan done on 7/30/2021 which demonstrated a possible old very small lacunar stroke in the left caudate which and I told her that she should take a baby aspirin daily.   She had a prolonged EEG which demonstrated focal right temporal slowing which may indicate an underlying structural or functional abnormality involving the right temporal lobe and occasional sharply contoured activity within this region which may be potentially epileptogenic activity placing patient at risk for focal seizures.  I started her on Keppra 500 mg BID following these results.  She has not had any further seizures or seizure like spells.  She is tolerating this medicine well and she is also following a ketogenic diet and feeling good.       Past Medical History:   Diagnosis Date   • Migraine    • Numbness and tingling    • Seizures    • Stroke         Family History   Problem Relation Age of Onset   • Arthritis Mother   "  • Diabetes Mother    • Migraines Sister         Social History     Socioeconomic History   • Marital status:    Tobacco Use   • Smoking status: Former     Packs/day: 1.00     Years: 17.00     Pack years: 17.00     Types: Cigarettes     Start date: 1984     Quit date: 1997     Years since quittin.5   • Smokeless tobacco: Never   Vaping Use   • Vaping Use: Never used   Substance and Sexual Activity   • Alcohol use: Yes     Comment: Occasional, light drinking   • Drug use: Never   • Sexual activity: Yes     Partners: Male     Birth control/protection: Other        I have reviewed and confirmed the accuracy of the ROS as documented by the MA/LPN/RN Marisa Maddox MD   Review of Systems   Neurological:  Positive for seizures. Negative for dizziness, tremors, syncope, facial asymmetry, speech difficulty, weakness, light-headedness, numbness, headache, memory problem and confusion.   Psychiatric/Behavioral:  Negative for agitation, behavioral problems, decreased concentration, dysphoric mood, hallucinations, self-injury, sleep disturbance, suicidal ideas, negative for hyperactivity, depressed mood and stress. The patient is not nervous/anxious.       Objective   Vital Signs:   /78   Pulse 69   Ht 164.8 cm (64.88\")   Wt 64.4 kg (142 lb)   SpO2 99%   BMI 23.72 kg/m²       PHYSICAL EXAM:    General   Mental Status - Alert. General Appearance - Well developed, Well groomed, Oriented and Cooperative. Orientation - Oriented X3.       Head and Neck  Head - normocephalic, atraumatic with no lesions or palpable masses.  Neck    Global Assessment - supple.       Eye   Sclera/Conjunctiva - Bilateral - Normal.    ENMT  Mouth and Throat   Oral Cavity/Oropharynx: Oropharynx - the soft palate,uvula and tongue are normal in appearance.    Chest and Lung Exam   Chest - lung clear to auscultation bilaterally.    Cardiovascular   Cardiovascular examination reveals  - normal heart sounds, regular rate and " rhythm.    Neurologic   Mental Status: Speech - Normal. Cognitive function - appropriate fund of knowledge. No impairment of attention, Impairment of concentration, impairment of long term memory or impairment of short term memory.  Cranial Nerves:   II Optic: Visual acuity - Left - Normal. Right - Normal. Visual fields - Normal (to confrontation).  III Oculomotor: Pupillary constriction - Left - Normal. Right - Normal.  VII Facial: - Normal Bilaterally.   IX Glossopharyngeal / X Vagus - Normal.  XI Accessory: Trapezius - Bilateral - Normal. Sternocleidomastoid - Bilateral - Normal.  XII Hypoglossal - Bilateral - Normal.  Eye Movements: - Normal Bilaterally.  Sensory:   Light Touch: Intact - Globally.  Motor:   Bulk and Contour: - Normal.  Tone: - Normal.  Tremor: Not present.  Strength: 5/5 normal muscle strength - All Muscles.   General Assessment of Reflexes: - deep tendon reflexes are normal. Coordination - No Impairment of finger-to-nose or Impairment of rapid alternating movements. Gait - Normal.       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 She had a brain MRI scan done on 7/30/2021 which demonstrated a possible old very small lacunar stroke in the left caudate which and I told her that she should take a baby aspirin daily.   She had a prolonged EEG which demonstrated focal right temporal slowing which may indicate an underlying structural or functional abnormality involving the right temporal lobe and occasional sharply contoured activity within this region which may be potentially epileptogenic activity placing patient at  risk for focal seizures.  I started her on Keppra 500 mg BID following these results.  She has not had any further seizures or seizure like spells.  She is tolerating this medicine well and she is also following a ketogenic diet and feeling good.  I will continue the Keppra at this time and will see her back in a year or sooner if needed. Discussed seizure precautions again.           Relevant Medications    levETIRAcetam (KEPPRA) 500 MG tablet       Follow Up   No follow-ups on file.  Patient was given instructions and counseling regarding her condition or for health maintenance advice. Please see specific information pulled into the AVS if appropriate.

## 2023-06-09 NOTE — PATIENT INSTRUCTIONS
[facility]  Marisa Maddox MD  Neurology clinic  [phone]    With anti-seizure medications, you may initially notice side effects of fatigue, drowsiness, unsteadiness, and dizziness.  Other possible side effects include nausea, abdominal pain, headache, blurry or double vision, slurred speech and mood changes.  Generally, patients will noticed these symptoms when the medication is first started or with higher doses and will go away with time.    It is import to consistently take your medication every day.  Missing just one dose may put you at risk for a breakthrough seizure.  Consider using reminders on your phone or a pill box.    If you develop a rash, please call the neurology clinic immediately or notify another healthcare professional, as this may be potentially life-threatening.  If you are unable to reach a healthcare professional, go to the emergency room immediately for further evaluation.    If you develop thoughts of wanting to hurt yourself or others, please call the neurology clinic immediately to notify another healthcare professional.  If you are unable to reach a healthcare professional, go to the emergency room immediately for further evaluation.    It is the [place] state law that you cannot drive within 90 days of a seizure.    You should avoid certain activities that if you were to have a seizure, you could harm yourself or others. In general, it is recommended that you avoid operating heavy machinery or power tools, swimming or taking baths by yourself (showers are ok), don't stand over open flames, don't get on high ladders or the roof.  I also recommend to avoid sleeping on your stomach.    For further information on epilepsy and resources available to patients and their families, please visit the [facility] at www.efky.org or call [phone].    **Check out the [facility]'s monthly Art Group Gathering.  They are located  at [facility], [address] [place] [address].  Call Laura Puga at [phone] or email her at [email or web address] for the dates of future gatherings.**      **If you have having memory problems, consider HOBSCOTCH (Home-Based Self-management and Cognitive Training Changes lives).  It is an 8 week self-management program for adults with epilepsy and memory problems.  The program is free at the [facility].  Contact Beatriz Gusman at [phone] or [email or web address].**         In general, we recommend using good judgement when you are doing certain activities and to avoid those activities that if you were to have a seizure, you could harm yourself or others. In the [facility], it is the law that you cannot drive within 90 days of a seizure. We also recommend not standing over open flames, not getting on high ladders or the roof, not swimming or taking baths by yourself (showers are ok) and not operating heavy machinery or power tools.

## 2024-01-26 ENCOUNTER — OFFICE VISIT (OUTPATIENT)
Dept: NEUROLOGY | Facility: CLINIC | Age: 54
End: 2024-01-26
Payer: COMMERCIAL

## 2024-01-26 VITALS
DIASTOLIC BLOOD PRESSURE: 78 MMHG | OXYGEN SATURATION: 98 % | WEIGHT: 155 LBS | HEIGHT: 65 IN | SYSTOLIC BLOOD PRESSURE: 138 MMHG | HEART RATE: 78 BPM | BODY MASS INDEX: 25.83 KG/M2

## 2024-01-26 DIAGNOSIS — G40.109 SEIZURE, TEMPORAL LOBE: Primary | ICD-10-CM

## 2024-01-26 PROCEDURE — 99214 OFFICE O/P EST MOD 30 MIN: CPT | Performed by: PSYCHIATRY & NEUROLOGY

## 2024-01-26 RX ORDER — LEVETIRACETAM 500 MG/1
500 TABLET ORAL 2 TIMES DAILY
Qty: 180 TABLET | Refills: 3 | Status: SHIPPED | OUTPATIENT
Start: 2024-01-26

## 2024-01-26 NOTE — ASSESSMENT & PLAN NOTE
52 year old woman with right temporal lobe epilepsy. She had a brain MRI scan done on 7/30/2021 which demonstrated a possible old very small lacunar stroke in the left caudate which and I told her that she should take a baby aspirin daily.   She had a prolonged EEG which demonstrated focal right temporal slowing which may indicate an underlying structural or functional abnormality involving the right temporal lobe and occasional sharply contoured activity within this region which may be potentially epileptogenic activity placing patient at risk for focal seizures.  I started her on Keppra 500 mg BID following these results.  Her most recent seizure was on 9/16/2021.  She has not had any further seizures or seizure like spells.  She is tolerating this medicine well and she is also following a ketogenic diet and feeling good. I discussed seizure precautions again.  Also discussed potential risk for SUDEP.  Epilepsy is a disorder that can potentially be life threatening and we discussed this again today.

## 2024-01-26 NOTE — PROGRESS NOTES
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 She had a brain MRI scan done on 2021 which demonstrated a possible old very small lacunar stroke in the left caudate which and I told her that she should take a baby aspirin daily.   She had a prolonged EEG which demonstrated focal right temporal slowing which may indicate an underlying structural or functional abnormality involving the right temporal lobe and occasional sharply contoured activity within this region which may be potentially epileptogenic activity placing patient at risk for focal seizures.  I started her on Keppra 500 mg BID following these results.  Her most recent seizure was on 2021.  She has not had any further seizures or seizure like spells.  She is tolerating this medicine well and she is also following a ketogenic diet and feeling good.        Past Medical History:   Diagnosis Date    Migraine     Numbness and tingling     Seizures     Stroke         Family History   Problem Relation Age of Onset    Arthritis Mother     Diabetes Mother     Migraines Sister         Social History     Socioeconomic History    Marital status:    Tobacco Use    Smoking status: Former     Packs/day: 1.00     Years: 17.00     Additional pack years: 0.00     Total pack years: 17.00     Types: Cigarettes     Start date: 1984     Quit date: 1997     Years since quittin.2    Smokeless tobacco: Never   Vaping Use    Vaping Use: Never used   Substance and Sexual Activity    Alcohol use: Not  "Currently     Comment: Occasional, light drinking    Drug use: Never    Sexual activity: Yes     Partners: Male     Birth control/protection: Other        I have reviewed and confirmed the accuracy of the ROS as documented by the MA/LPN/RN Marisa Maddox MD   Review of Systems   Neurological:  Negative for dizziness, tremors, seizures, syncope, facial asymmetry, speech difficulty, weakness, light-headedness, numbness, headache, memory problem and confusion.   Psychiatric/Behavioral:  Negative for agitation, behavioral problems, decreased concentration, dysphoric mood, hallucinations, self-injury, sleep disturbance, suicidal ideas, negative for hyperactivity, depressed mood and stress. The patient is not nervous/anxious.         Objective   Vital Signs:   /78   Pulse 78   Ht 164.8 cm (64.88\")   Wt 70.3 kg (155 lb)   SpO2 98%   BMI 25.89 kg/m²       PHYSICAL EXAM:    General   Mental Status - Alert. General Appearance - Well developed, Well groomed, Oriented and Cooperative. Orientation - Oriented X3.       Head and Neck  Head - normocephalic, atraumatic with no lesions or palpable masses.  Neck    Global Assessment - supple.       Eye   Sclera/Conjunctiva - Bilateral - Normal.    ENMT  Mouth and Throat   Oral Cavity/Oropharynx: Oropharynx - the soft palate,uvula and tongue are normal in appearance.    Chest and Lung Exam   Chest - lung clear to auscultation bilaterally.    Cardiovascular   Cardiovascular examination reveals  - normal heart sounds, regular rate and rhythm.    Neurologic   Mental Status: Speech - Normal. Cognitive function - appropriate fund of knowledge. No impairment of attention, Impairment of concentration, impairment of long term memory or impairment of short term memory.  Cranial Nerves:   II Optic: Visual acuity - Left - Normal. Right - Normal. Visual fields - Normal (to confrontation).  III Oculomotor: Pupillary constriction - Left - Normal. Right - Normal.  VII Facial: - Normal " Bilaterally.   IX Glossopharyngeal / X Vagus - Normal.  XI Accessory: Trapezius - Bilateral - Normal. Sternocleidomastoid - Bilateral - Normal.  XII Hypoglossal - Bilateral - Normal.  Eye Movements: - Normal Bilaterally.  Sensory:   Light Touch: Intact - Globally.  Motor:   Bulk and Contour: - Normal.  Tone: - Normal.  Tremor: Not present.  Strength: 5/5 normal muscle strength - All Muscles.   General Assessment of Reflexes: - deep tendon reflexes are normal. Coordination - No Impairment of finger-to-nose or Impairment of rapid alternating movements. Gait - Normal.       Result Review :                 Assessment and Plan    Problem List Items Addressed This Visit          Neuro    Seizure, temporal lobe - Primary    Current Assessment & Plan     52 year old woman with right temporal lobe epilepsy. She had a brain MRI scan done on 7/30/2021 which demonstrated a possible old very small lacunar stroke in the left caudate which and I told her that she should take a baby aspirin daily.   She had a prolonged EEG which demonstrated focal right temporal slowing which may indicate an underlying structural or functional abnormality involving the right temporal lobe and occasional sharply contoured activity within this region which may be potentially epileptogenic activity placing patient at risk for focal seizures.  I started her on Keppra 500 mg BID following these results.  Her most recent seizure was on 9/16/2021.  She has not had any further seizures or seizure like spells.  She is tolerating this medicine well and she is also following a ketogenic diet and feeling good. I discussed seizure precautions again.  Also discussed potential risk for SUDEP.  Epilepsy is a disorder that can potentially be life threatening and we discussed this again today.           Relevant Medications    levETIRAcetam (KEPPRA) 500 MG tablet       Follow Up   Return in about 1 year (around 1/26/2025).  Patient was given instructions and  counseling regarding her condition or for health maintenance advice. Please see specific information pulled into the AVS if appropriate.

## 2024-01-26 NOTE — PATIENT INSTRUCTIONS
In general, we recommend using good judgement when you are doing certain activities and to avoid those activities that if you were to have a seizure, you could harm yourself or others. In the state of [place], it is the law that you cannot drive within 90 days of a seizure. We also recommend not standing over open flames, not getting on high ladders or the roof, not swimming or taking baths by yourself (showers are ok) and not operating heavy machinery or power tools.  [facility]  Marisa Maddox MD  Neurology clinic  [phone]    With anti-seizure medications, you may initially notice side effects of fatigue, drowsiness, unsteadiness, and dizziness.  Other possible side effects include nausea, abdominal pain, headache, blurry or double vision, slurred speech and mood changes.  Generally, patients will noticed these symptoms when the medication is first started or with higher doses and will go away with time.    It is import to consistently take your medication every day.  Missing just one dose may put you at risk for a breakthrough seizure.  Consider using reminders on your phone or a pill box.    If you develop a rash, please call the neurology clinic immediately or notify another healthcare professional, as this may be potentially life-threatening.  If you are unable to reach a healthcare professional, go to the emergency room immediately for further evaluation.    If you develop thoughts of wanting to hurt yourself or others, please call the neurology clinic immediately to notify another healthcare professional.  If you are unable to reach a healthcare professional, go to the emergency room immediately for further evaluation.    It is the [place] state law that you cannot drive within 90 days of a seizure.    You should avoid certain activities that if you were to have a seizure, you could harm yourself or others. In general, it is recommended that you avoid operating heavy machinery or  power tools, swimming or taking baths by yourself (showers are ok), don't stand over open flames, don't get on high ladders or the roof.  I also recommend to avoid sleeping on your stomach.    For further information on epilepsy and resources available to patients and their families, please visit the [facility]ile[facility] at [email or web address] or call [phone].    **Check out the [facility]'s monthly Art Group Gathering.  [facility] [address].  Call Laura Puga at [phone] or email her at [email or web address] for the dates of future gatherings.**      **If you have having memory problems, consider HOBSCOTCH (Home-Based Self-management and Cognitive Training Changes lives).  It is an 8 week self-management program for adults with epilepsy and memory problems.  The program is free at the Epile[facility] [address].  Contact Beatriz Gusman at [phone] or [email or web address].**

## 2025-01-21 DIAGNOSIS — G40.109 SEIZURE, TEMPORAL LOBE: ICD-10-CM

## 2025-01-21 RX ORDER — LEVETIRACETAM 500 MG/1
500 TABLET ORAL 2 TIMES DAILY
Qty: 60 TABLET | Refills: 1 | Status: SHIPPED | OUTPATIENT
Start: 2025-01-21

## 2025-03-21 ENCOUNTER — TELEPHONE (OUTPATIENT)
Dept: NEUROLOGY | Facility: CLINIC | Age: 55
End: 2025-03-21
Payer: COMMERCIAL

## 2025-03-21 ENCOUNTER — OFFICE VISIT (OUTPATIENT)
Dept: NEUROLOGY | Facility: CLINIC | Age: 55
End: 2025-03-21
Payer: COMMERCIAL

## 2025-03-21 VITALS
HEIGHT: 65 IN | OXYGEN SATURATION: 99 % | HEART RATE: 84 BPM | DIASTOLIC BLOOD PRESSURE: 84 MMHG | SYSTOLIC BLOOD PRESSURE: 158 MMHG | BODY MASS INDEX: 27.82 KG/M2 | WEIGHT: 167 LBS

## 2025-03-21 DIAGNOSIS — Z86.73 HISTORY OF STROKE: ICD-10-CM

## 2025-03-21 DIAGNOSIS — G40.109 SEIZURE, TEMPORAL LOBE: Primary | ICD-10-CM

## 2025-03-21 PROCEDURE — 99214 OFFICE O/P EST MOD 30 MIN: CPT | Performed by: PSYCHIATRY & NEUROLOGY

## 2025-03-21 RX ORDER — LEVETIRACETAM 500 MG/1
500 TABLET ORAL 2 TIMES DAILY
Qty: 180 TABLET | Refills: 3 | Status: SHIPPED | OUTPATIENT
Start: 2025-03-21

## 2025-03-21 NOTE — PROGRESS NOTES
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 She had a brain MRI scan done on 7/30/2021 which demonstrated a possible old very small lacunar stroke in the left caudate which and I told her that she should take a baby aspirin daily.   She had a prolonged EEG which demonstrated focal right temporal slowing which may indicate an underlying structural or functional abnormality involving the right temporal lobe and occasional sharply contoured activity within this region which may be potentially epileptogenic activity placing patient at risk for focal seizures.  I started her on Keppra 500 mg BID following these results.  Her most recent seizure was on 9/16/2021.  She has not had any further seizures or seizure like spells.  She is tolerating this medicine well and she is also following a ketogenic diet at this time and feeling good.  She did have increased stress due to family situations since last visit but fortunately she did not have any seizures.  She also denies any new stroke like symptoms.       Past Medical History:   Diagnosis Date    Migraine     Numbness and tingling     Seizures     Stroke         Family History   Problem Relation Age of Onset    Arthritis Mother     Diabetes Mother     Migraines Sister         Social History     Socioeconomic History    Marital status:    Tobacco Use    Smoking status: Former     Current packs/day: 0.00     Average packs/day: 1 pack/day for 17.0 years (17.0 ttl pk-yrs)     Types: Cigarettes     Start date:  "1984     Quit date: 1997     Years since quittin.3    Smokeless tobacco: Never   Vaping Use    Vaping status: Never Used   Substance and Sexual Activity    Alcohol use: Not Currently     Comment: Occasional, light drinking    Drug use: Never    Sexual activity: Yes     Partners: Male     Birth control/protection: Other        I have reviewed and confirmed the accuracy of the ROS as documented by the MA/LPN/RN Marisa Maddox MD   Review of Systems   Neurological:  Positive for seizures. Negative for dizziness, tremors, syncope, facial asymmetry, speech difficulty, weakness, light-headedness, numbness, headache, memory problem and confusion.   Psychiatric/Behavioral:  Negative for agitation, behavioral problems, decreased concentration, dysphoric mood, hallucinations, self-injury, sleep disturbance, suicidal ideas, negative for hyperactivity, depressed mood and stress. The patient is not nervous/anxious.         Objective   Vital Signs:   /84   Pulse 84   Ht 164.8 cm (64.88\")   Wt 75.8 kg (167 lb)   SpO2 99%   BMI 27.89 kg/m²       PHYSICAL EXAM:    General   Mental Status - Alert. General Appearance - Well developed, Well groomed, Oriented and Cooperative. Orientation - Oriented X3.       Head and Neck  Head - normocephalic, atraumatic with no lesions or palpable masses.  Neck    Global Assessment - supple.       Eye   Sclera/Conjunctiva - Bilateral - Normal.    ENMT  Mouth and Throat   Oral Cavity/Oropharynx: Oropharynx - the soft palate,uvula and tongue are normal in appearance.    Chest and Lung Exam   Chest - lung clear to auscultation bilaterally.    Cardiovascular   Cardiovascular examination reveals  - normal heart sounds, regular rate and rhythm.    Neurologic   Mental Status: Speech - Normal. Cognitive function - appropriate fund of knowledge. No impairment of attention, Impairment of concentration, impairment of long term memory or impairment of short term memory.  Cranial Nerves: "   II Optic: Visual acuity - Left - Normal. Right - Normal. Visual fields - Normal (to confrontation).  III Oculomotor: Pupillary constriction - Left - Normal. Right - Normal.  VII Facial: - Normal Bilaterally.   IX Glossopharyngeal / X Vagus - Normal.  XI Accessory: Trapezius - Bilateral - Normal. Sternocleidomastoid - Bilateral - Normal.  XII Hypoglossal - Bilateral - Normal.  Eye Movements: - Normal Bilaterally.  Sensory:   Light Touch: Intact - Globally.  Motor:   Bulk and Contour: - Normal.  Tone: - Normal.  Tremor: Not present.  Strength: 5/5 normal muscle strength - All Muscles.   General Assessment of Reflexes: - deep tendon reflexes are normal. Coordination - No Impairment of finger-to-nose or Impairment of rapid alternating movements. Gait - Normal.         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 She had a brain MRI scan done on 7/30/2021 which demonstrated a possible old very small lacunar stroke in the left caudate which and I told her that she should take a baby aspirin daily.   She had a prolonged EEG which demonstrated focal right temporal slowing which may indicate an underlying structural or functional abnormality involving the right temporal lobe and occasional sharply contoured activity within this region which may be potentially epileptogenic activity placing patient at risk for focal seizures.  I started her on Keppra 500 mg BID following these results.  Her most recent seizure was on 9/16/2021.  She has not had any further seizures or seizure like spells.  She is tolerating this medicine well and she is also following a  ketogenic diet at this time and feeling good.  She did have increased stress due to family situations since last visit but fortunately she did not have any seizures.  She also denies any new stroke like symptoms.  I will continue current dose of levetiracetam and advised her to take this medicine as prescribed without missing any doses as epileptic seizures can be potentially fatal.  Discussed seizure precautions.           Relevant Medications    levETIRAcetam (KEPPRA) 500 MG tablet    History of stroke    Current Assessment & Plan   I spoke with her again about taking a baby aspirin, she is not interested.  BP is elevated today but she reports being normal at home.  I advised her to continue monitoring her BP at home and recording and follow up with PCP.  Discussed stroke symptoms which she denies.              Follow Up   Return in about 1 year (around 3/21/2026).  Patient was given instructions and counseling regarding her condition or for health maintenance advice. Please see specific information pulled into the AVS if appropriate.      No

## 2025-03-21 NOTE — TELEPHONE ENCOUNTER
Pt seen in office today, dropped off transportation paperwork, filled out and signed by Dr. Maddox- faxed to [employer]. Notified pt via [employer]

## 2025-03-21 NOTE — ASSESSMENT & PLAN NOTE
I spoke with her again about taking a baby aspirin, she is not interested.  BP is elevated today but she reports being normal at home.  I advised her to continue monitoring her BP at home and recording and follow up with PCP.  Discussed stroke symptoms which she denies.

## 2025-03-21 NOTE — PATIENT INSTRUCTIONS
In general, we recommend using good judgement when you are doing certain activities and to avoid those activities that if you were to have a seizure, you could harm yourself or others. In the state of [place], it is the law that you cannot drive within 90 days of a seizure. We also recommend not standing over open flames, not getting on high ladders or the roof, not swimming or taking baths by yourself (showers are ok) and not operating heavy machinery or power tools.  [facility]  Marisa Maddox MD  Neurology clinic  [phone]    With anti-seizure medications, you may initially notice side effects of fatigue, drowsiness, unsteadiness, and dizziness.  Other possible side effects include nausea, abdominal pain, headache, blurry or double vision, slurred speech and mood changes.  Generally, patients will noticed these symptoms when the medication is first started or with higher doses and will go away with time.    It is import to consistently take your medication every day.  Missing just one dose may put you at risk for a breakthrough seizure.  Consider using reminders on your phone or a pill box.    If you develop a rash, please call the neurology clinic immediately or notify another healthcare professional, as this may be potentially life-threatening.  If you are unable to reach a healthcare professional, go to the emergency room immediately for further evaluation.    If you develop thoughts of wanting to hurt yourself or others, please call the neurology clinic immediately to notify another healthcare professional.  If you are unable to reach a healthcare professional, go to the emergency room immediately for further evaluation.    It is the [place] state law that you cannot drive within 90 days of a seizure.    You should avoid certain activities that if you were to have a seizure, you could harm yourself or others. In general, it is recommended that you avoid operating heavy machinery or  power tools, swimming or taking baths by yourself (showers are ok), don't stand over open flames, don't get on high ladders or the roof.  I also recommend to avoid sleeping on your stomach.    For further information on epilepsy and resources available to patients and their families, please visit the [facility] [place] [facility] at [email or web address] or call [phone].    **Check out the [facility]psy [facility] [place] [facility]'s monthly Art Group Gathering.  They are [facility] at [facility] [place], [address] [place] [address].  Call Laura Vivien at [phone] or email her at [email or web address] for the dates of future gatherings.**      **If you have having memory problems, consider HOBSCOTCH (Home-Based Self-management and Cognitive Training Changes lives).  It is an 8 week self-management program for adults with epilepsy and memory problems.  The program is free at the Epilepsy [facility].  Contact Beatriz Gusman at [phone] or [email or web address].**         Lower blood pressure by restricting salt in diet (less than 2400 mg/day), weight loss, increase fruits, vegetables, whole grains, and low-fat dairy products.    Consider the DASH diet or Mediterranean diet.  Increase fish and poultry intake.    Avoid sodas, sweets, and red meat.    Limit alcohol consumption.    Monitor and keep blood pressure, cholesterol and blood sugar at goal.    Avoid the use of tobacco and avoid secondhand smoke.    Engage in moderate to vigorous intensity aerobic exercise for about 40 minutes 3-4 times per week.  Consider lower intensity ritu chi or yoga if necessary.    Take antiplatelet medication regularly.    If you snore, wake up unrefreshed or feel tired during the day, talk to your doctor as these may be signs of sleep apnea and a sleep study may be indicated.

## 2025-03-21 NOTE — ASSESSMENT & PLAN NOTE
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 She had a brain MRI scan done on 7/30/2021 which demonstrated a possible old very small lacunar stroke in the left caudate which and I told her that she should take a baby aspirin daily.   She had a prolonged EEG which demonstrated focal right temporal slowing which may indicate an underlying structural or functional abnormality involving the right temporal lobe and occasional sharply contoured activity within this region which may be potentially epileptogenic activity placing patient at risk for focal seizures.  I started her on Keppra 500 mg BID following these results.  Her most recent seizure was on 9/16/2021.  She has not had any further seizures or seizure like spells.  She is tolerating this medicine well and she is also following a ketogenic diet at this time and feeling good.  She did have increased stress due to family situations since last visit but fortunately she did not have any seizures.  She also denies any new stroke like symptoms.  I will continue current dose of levetiracetam and advised her to take this medicine as prescribed without missing any doses as epileptic seizures can be potentially fatal.  Discussed seizure precautions.